# Patient Record
Sex: FEMALE | Race: WHITE
[De-identification: names, ages, dates, MRNs, and addresses within clinical notes are randomized per-mention and may not be internally consistent; named-entity substitution may affect disease eponyms.]

---

## 2020-07-07 ENCOUNTER — APPOINTMENT (OUTPATIENT)
Dept: RHEUMATOLOGY | Facility: CLINIC | Age: 65
End: 2020-07-07
Payer: COMMERCIAL

## 2020-07-07 VITALS
DIASTOLIC BLOOD PRESSURE: 80 MMHG | TEMPERATURE: 98 F | SYSTOLIC BLOOD PRESSURE: 120 MMHG | HEART RATE: 78 BPM | OXYGEN SATURATION: 97 % | BODY MASS INDEX: 28.35 KG/M2 | WEIGHT: 160 LBS | HEIGHT: 63 IN

## 2020-07-07 DIAGNOSIS — Z78.9 OTHER SPECIFIED HEALTH STATUS: ICD-10-CM

## 2020-07-07 DIAGNOSIS — Z82.69 FAMILY HISTORY OF OTHER DISEASES OF THE MUSCULOSKELETAL SYSTEM AND CONNECTIVE TISSUE: ICD-10-CM

## 2020-07-07 DIAGNOSIS — Z82.49 FAMILY HISTORY OF ISCHEMIC HEART DISEASE AND OTHER DISEASES OF THE CIRCULATORY SYSTEM: ICD-10-CM

## 2020-07-07 PROCEDURE — 36415 COLL VENOUS BLD VENIPUNCTURE: CPT

## 2020-07-07 PROCEDURE — 99244 OFF/OP CNSLTJ NEW/EST MOD 40: CPT | Mod: 25

## 2020-07-07 RX ORDER — BIOTIN 10 MG
TABLET ORAL
Refills: 0 | Status: ACTIVE | COMMUNITY

## 2020-07-07 RX ORDER — PROPRANOLOL HYDROCHLORIDE 40 MG/1
40 TABLET ORAL
Refills: 0 | Status: ACTIVE | COMMUNITY

## 2020-07-07 RX ORDER — AMLODIPINE BESYLATE 10 MG/1
10 TABLET ORAL
Refills: 0 | Status: ACTIVE | COMMUNITY

## 2020-07-07 RX ORDER — SIMVASTATIN 40 MG/1
40 TABLET, FILM COATED ORAL
Refills: 0 | Status: ACTIVE | COMMUNITY

## 2020-07-07 NOTE — ASSESSMENT
[FreeTextEntry1] : 65 y/o woman with hx of HTN, HLD, who presents for management of her hx of RA.  I agree with the diagnosis.  \par She is in need of acute as well as maintenance treatment.  \par \par \par Plan:\par -Rx prednisone 20mg daily 4 days, then 15mg daily x 7 days, then 10mg daily x 14 days, then 5mg daily thereafter\par -Check Hep B/C/Quantiferon\par -Plan to start leflunomide 10mg daily.  Reviewed potential side effects of LEF which include but are not limited to elevated liver enzymes with liver damage, cytopenias/decreased blood counts, allergic reaction, abdominal bloating/cramping/diarrhea.\par -Patient is aware to hold LEF if develops infection.  Patient is aware to avoid alcohol while on this medication.  \par \par -f/u 6 weeks, labs at 4 weeks

## 2020-07-09 DIAGNOSIS — Z87.39 PERSONAL HISTORY OF OTHER DISEASES OF THE MUSCULOSKELETAL SYSTEM AND CONNECTIVE TISSUE: ICD-10-CM

## 2020-07-09 LAB
ALBUMIN SERPL ELPH-MCNC: 4.5 G/DL
ALP BLD-CCNC: 61 U/L
ALT SERPL-CCNC: 26 U/L
ANA PAT FLD IF-IMP: ABNORMAL
ANA SER IF-ACNC: ABNORMAL
ANION GAP SERPL CALC-SCNC: 12 MMOL/L
APPEARANCE: CLEAR
AST SERPL-CCNC: 25 U/L
BASOPHILS # BLD AUTO: 0.03 K/UL
BASOPHILS NFR BLD AUTO: 0.5 %
BILIRUB SERPL-MCNC: 0.5 MG/DL
BILIRUBIN URINE: NEGATIVE
BLOOD URINE: NEGATIVE
BUN SERPL-MCNC: 10 MG/DL
C3 SERPL-MCNC: 136 MG/DL
C4 SERPL-MCNC: 21 MG/DL
CALCIUM SERPL-MCNC: 9.8 MG/DL
CCP AB SER IA-ACNC: >250 UNITS
CENTROMERE IGG SER-ACNC: <0.2 CD:130001892
CHLORIDE SERPL-SCNC: 100 MMOL/L
CO2 SERPL-SCNC: 29 MMOL/L
COLOR: NORMAL
CREAT SERPL-MCNC: 0.65 MG/DL
CRP SERPL-MCNC: <0.1 MG/DL
DSDNA AB SER-ACNC: 12 IU/ML
ENA RNP AB SER IA-ACNC: 0.6 AL
ENA SCL70 IGG SER IA-ACNC: <0.2 AL
ENA SM AB SER IA-ACNC: <0.2 AL
ENA SS-A AB SER IA-ACNC: <0.2 AL
ENA SS-B AB SER IA-ACNC: <0.2 AL
EOSINOPHIL # BLD AUTO: 0.09 K/UL
EOSINOPHIL NFR BLD AUTO: 1.5 %
ERYTHROCYTE [SEDIMENTATION RATE] IN BLOOD BY WESTERGREN METHOD: 46 MM/HR
GLUCOSE QUALITATIVE U: NEGATIVE
GLUCOSE SERPL-MCNC: 88 MG/DL
HBV CORE IGG+IGM SER QL: NONREACTIVE
HBV SURFACE AB SERPL IA-ACNC: <3 MIU/ML
HBV SURFACE AG SER QL: NONREACTIVE
HCT VFR BLD CALC: 42.2 %
HCV AB SER QL: NONREACTIVE
HCV S/CO RATIO: 0.16 S/CO
HGB BLD-MCNC: 13.4 G/DL
IMM GRANULOCYTES NFR BLD AUTO: 0.2 %
KETONES URINE: NEGATIVE
LEUKOCYTE ESTERASE URINE: NEGATIVE
LYMPHOCYTES # BLD AUTO: 1.95 K/UL
LYMPHOCYTES NFR BLD AUTO: 33.3 %
M TB IFN-G BLD-IMP: NEGATIVE
MAN DIFF?: NORMAL
MCHC RBC-ENTMCNC: 29.6 PG
MCHC RBC-ENTMCNC: 31.8 GM/DL
MCV RBC AUTO: 93.4 FL
MONOCYTES # BLD AUTO: 0.39 K/UL
MONOCYTES NFR BLD AUTO: 6.7 %
NEUTROPHILS # BLD AUTO: 3.38 K/UL
NEUTROPHILS NFR BLD AUTO: 57.8 %
NITRITE URINE: NEGATIVE
PH URINE: 7
PLATELET # BLD AUTO: 208 K/UL
POTASSIUM SERPL-SCNC: 4.2 MMOL/L
PROT SERPL-MCNC: 7.7 G/DL
PROTEIN URINE: NEGATIVE
QUANTIFERON TB PLUS MITOGEN MINUS NIL: 6.11 IU/ML
QUANTIFERON TB PLUS NIL: 0.02 IU/ML
QUANTIFERON TB PLUS TB1 MINUS NIL: 0 IU/ML
QUANTIFERON TB PLUS TB2 MINUS NIL: 0 IU/ML
RBC # BLD: 4.52 M/UL
RBC # FLD: 13.1 %
RF+CCP IGG SER-IMP: ABNORMAL
RHEUMATOID FACT SER QL: 368 IU/ML
SODIUM SERPL-SCNC: 141 MMOL/L
SPECIFIC GRAVITY URINE: 1.01
THYROGLOB AB SERPL-ACNC: <20 IU/ML
THYROPEROXIDASE AB SERPL IA-ACNC: 17.8 IU/ML
UROBILINOGEN URINE: NORMAL
WBC # FLD AUTO: 5.85 K/UL

## 2020-08-13 ENCOUNTER — RX RENEWAL (OUTPATIENT)
Age: 65
End: 2020-08-13

## 2020-08-14 ENCOUNTER — RX RENEWAL (OUTPATIENT)
Age: 65
End: 2020-08-14

## 2020-08-25 ENCOUNTER — APPOINTMENT (OUTPATIENT)
Dept: RHEUMATOLOGY | Facility: CLINIC | Age: 65
End: 2020-08-25

## 2020-09-22 ENCOUNTER — APPOINTMENT (OUTPATIENT)
Dept: RHEUMATOLOGY | Facility: CLINIC | Age: 65
End: 2020-09-22
Payer: COMMERCIAL

## 2020-09-22 VITALS
SYSTOLIC BLOOD PRESSURE: 130 MMHG | WEIGHT: 172 LBS | TEMPERATURE: 97.7 F | HEIGHT: 63 IN | BODY MASS INDEX: 30.48 KG/M2 | HEART RATE: 72 BPM | OXYGEN SATURATION: 97 % | DIASTOLIC BLOOD PRESSURE: 80 MMHG

## 2020-09-22 PROCEDURE — 99215 OFFICE O/P EST HI 40 MIN: CPT | Mod: 25

## 2020-09-22 PROCEDURE — 90686 IIV4 VACC NO PRSV 0.5 ML IM: CPT

## 2020-09-22 PROCEDURE — 36415 COLL VENOUS BLD VENIPUNCTURE: CPT

## 2020-09-22 PROCEDURE — G0008: CPT

## 2020-09-22 NOTE — REVIEW OF SYSTEMS
[Negative] : Heme/Lymph [Lower Ext Edema] : lower extremity edema [Anxiety] : anxiety [As Noted in HPI] : as noted in HPI [Arthralgias] : arthralgias [Joint Pain] : joint pain [Joint Swelling] : joint swelling [Joint Stiffness] : joint stiffness [Depression] : depression [FreeTextEntry2] : night sweats finally resolved last yr.. otherwise ok  [FreeTextEntry3] : R eye lazy but no visual disturbance and early cataract L, nothing elswe  [FreeTextEntry4] : long standing dental issues no significant sicca  [FreeTextEntry5] : denies hx DVT, PE, MI/ CVA -occas ... SVT on propranol..  [de-identified] : mild intermittent tingling in fingers/ toes -mild intermittent  [de-identified] : always fatigued, but can't sit still.. anxiety and depression moderate-severe today

## 2020-09-22 NOTE — ASSESSMENT
[FreeTextEntry1] : 64 y/o woman with hx of HTN, HLD, NERISSA/ MDD w/ chronic Rx pain medications, presents for management of her hx of seropositive RA.    \par \par \par 1) RA:  seropositive , CCP > 250 w/ ESR 47 upon initial eval.. off all tx for several ys between rheumatologists.  \par Now on leflunomide 10 mg w/ incomplete response (well tolerated), pain/ mild synovitis persists despite also taking 10 mg pred (some definite increase in anxiety noted).. \par Needs updated xrays of hands/ cspine to r/o erosive change, few physical deformities noted on exam\par Denies extraarticular disease \par Lengthy discussion regarding tx options, though we could increase leflunomide to 20 mg would rather like to consider add on tx.. \par Will try Xeljanx now.. if incomplete response can increase leflunomide.. given seropositivity.. and hx of severe pain resulting in need for chronic opiates when RA not well controlled.  \par NOTE: \par Prednisone\par Humira x 1 y then loss efficacy\par MTX ?? not effective or tolerated?\par Orencia x 1 yr then loss efficacy \par \par 2) Mood disorder:  ys of NERISSA/ MDD intermittent tx in past, on nothing now and admits to increase in anxiety.. \par Would like something to help.. \par Sleep ok:  though NRS.. exhausted and falls right to sleep, sleeps through but NRS.. \par Is willing and eager to start something to help with mood. For immediate relief given hydroxyzine 25 mg ok to use 1-2 tabs up to q 4 hs if necessary, caution driving 2/2 sedation\par NOTE :\par sertraline not effective \par Lexapro not effectve \par \par 3) Chronic pain syndrome:  likely 2/2 fair/ poorly controlled RA, little on exam to suggest advanced DJD.. \par Longstanding use Suboxone working w/ Dr. snow.. \par \par \par Plan:\par - should get updated hands xrays and c spine\par  \par - For now continue prednisone at 5 mg twice daily.. till next visit \par \par - for now continue leflunomide at 10 mg daily.. may in future increase this.. \par \par - start citalopram 10 mg daily x 2 wks then start...\par \par - Xeljanz.. after 1 wk if ok and \par \par - if depression and anxiety still considerable increase to 20 mg citalopram.. \par \par - 1 wk before next visit, lower prednisone 5 mg once daily... \par \par - talk to your pain management dr about lowering suboxone.. with goal of stopping completely in future.. \par \par - for immediate anxiety use Hydroxyzine as needed up to every 4-6 hs if not too sleepy...   \par \par -f/u 4  weeks

## 2020-09-22 NOTE — HISTORY OF PRESENT ILLNESS
[FreeTextEntry1] : 9/22/20\par - symmetrical joint pain most severe hands b/l at [facility]/ wrists and shoulders/ neck.. up to 2 hs am stiffness.. despite tx \par - taking 10 mg pred and started leflunomide 10 mg, both well tolerated though admits increased anxiety over past several weeks.. though also admits to tremendous stress at home\par - see calc:  Anxiety and depression + Has been on sertrline, lexapro in past w/ limited benefit.  Requesting something for anxiety.. but on suboxone long term, can't consider benzo.. \par - Humira worked for nearly 1 yr then stopped .. and Orencia x 1 yr lost efficacy \par - on suboxone:  per Dr Jara ( chronic pain medication.. \par \par 1) SEropositive RA:  high titer RF/ CCP\par \par This is a 65 y/o woman with hx of HTN, HLD, RA diagnosed many years ago.  \par \par She initially saw Dr. Nino, who told her it could be RA or could be lupus, based on some serologies.  He stopped taking her insurance, so she followed with another Rheumatologist, Dr. Perri Lara.  Last visit was several years ago.  She stopped taking the RA meds because she felt it wasn't helping.\par \par She was previously on \par Prednisone\par Humira\par MTX\par Orencia.\par \par No adverse reactions that she recalls.  \par \par Currently she has pain in hands/wrists/knees/ankles/feet.\par She has AM stiffness of about 2 hours.  \par

## 2020-09-22 NOTE — PHYSICAL EXAM
[General Appearance - Alert] : alert [General Appearance - In No Acute Distress] : in no acute distress [General Appearance - Well Nourished] : well nourished [General Appearance - Well Developed] : well developed [General Appearance - Well-Appearing] : healthy appearing [Sclera] : the sclera and conjunctiva were normal [PERRL With Normal Accommodation] : pupils were equal in size, round, and reactive to light [Extraocular Movements] : extraocular movements were intact [Outer Ear] : the ears and nose were normal in appearance [Hearing Threshold Finger Rub Not [place]] : hearing was normal [Examination Of The Oral Cavity] : the lips and gums were normal [Nasal Cavity] : the nasal mucosa and septum were normal [Oropharynx] : the oropharynx was normal [Neck Appearance] : the appearance of the neck was normal [Neck Cervical Mass (___cm)] : no neck mass was observed [Jugular Venous Distention Increased] : there was no jugular-venous distention [Thyroid Diffuse Enlargement] : the thyroid was not enlarged [Thyroid Nodule] : there were no palpable thyroid nodules [Auscultation Breath Sounds / Voice Sounds] : lungs were clear to auscultation bilaterally [Heart Rate And Rhythm] : heart rate was normal and rhythm regular [Heart Sounds] : normal S1 and S2 [Heart Sounds Gallop] : no gallops [Murmurs] : no murmurs [Heart Sounds Pericardial Friction Rub] : no pericardial rub [Edema] : there was no peripheral edema [Bowel Sounds] : normal bowel sounds [Abdomen Soft] : soft [Abdomen Tenderness] : non-tender [Abdomen Mass (___ Cm)] : no abdominal mass palpated [Cervical Lymph Nodes Enlarged Posterior Bilaterally] : posterior cervical [Cervical Lymph Nodes Enlarged Anterior Bilaterally] : anterior cervical [Supraclavicular Lymph Nodes Enlarged Bilaterally] : supraclavicular [No CVA Tenderness] : no ~M costovertebral angle tenderness [No Spinal Tenderness] : no spinal tenderness [Abnormal Walk] : normal gait [Motor Tone] : muscle strength and tone were normal [Skin Color & Pigmentation] : normal skin color and pigmentation [Skin Turgor] : normal skin turgor [] : no rash [Deep Tendon Reflexes (DTR)] : deep tendon reflexes were 2+ and symmetric [Sensation] : the sensory exam was normal to light touch and pinprick [No Focal Deficits] : no focal deficits [FreeTextEntry1] : 9/22/20 PHQ 12 moderate/ NERISSA 19 severe anxiety, diffucult function..

## 2020-09-22 NOTE — DATA REVIEWED
[FreeTextEntry1] : Labs: \par 7/20 , CCP > 250 w/ ESR 46, EMANI 1:320H,  nl CBC, CMP,  CRP, C3/4, TSH, PTH, CK, SPEP, PTT w/ neg, EMANI and subserologies to include APS/ LAC, SCL70, MARKIE, ACE, TPO/ TG, tTG, Hep B/ C and neg HsAb, quantiferon gold,  UA\par \par \par

## 2020-09-22 NOTE — PROCEDURE
[Today's Date:] : Date: [unfilled] [Risks] : risks [Benefits] : benefits [Alternatives] : alternatives [Consent Obtained] : written consent was obtained prior to the procedure and is detailed in the patient's record [Patient] : Prior to the start of the procedure a time out was taken and the identity of the patient was confirmed via name and date of birth with the patient. The correct site and the procedure to be performed were confirmed. The correct side was confirmed if applicable. The availability of the correct equipment was verified [Therapeutic] : therapeutic [#1 Site: ______] : #1 site identified in the [unfilled] [Alcohol] : alcohol [25 gauge 5/8  inch] : A 25 gauge 5/8 inch needle was used [No Complications] : there were no complications [Instructions Given] : handouts/patient instructions were given to patient [Patient Instructed to Call] : patient was instructed to call if redness at site, a decrease in range of motion or an increase in pain is noted after procedure. [de-identified] : Fluzone [FreeTextEntry1] : monitor for flulike sx or allergic rxn, take APAP as needed for first 3 days, if flulike sx persist call office\par \par \par \par

## 2020-09-24 LAB
ALBUMIN SERPL ELPH-MCNC: 4.4 G/DL
ALP BLD-CCNC: 64 U/L
ALT SERPL-CCNC: 18 U/L
ANION GAP SERPL CALC-SCNC: 12 MMOL/L
AST SERPL-CCNC: 17 U/L
BASOPHILS # BLD AUTO: 0.02 K/UL
BASOPHILS NFR BLD AUTO: 0.4 %
BILIRUB SERPL-MCNC: 0.5 MG/DL
BUN SERPL-MCNC: 9 MG/DL
CALCIUM SERPL-MCNC: 9.5 MG/DL
CHLORIDE SERPL-SCNC: 101 MMOL/L
CO2 SERPL-SCNC: 27 MMOL/L
CREAT SERPL-MCNC: 0.63 MG/DL
CRP SERPL-MCNC: <0.1 MG/DL
EOSINOPHIL # BLD AUTO: 0.06 K/UL
EOSINOPHIL NFR BLD AUTO: 1.3 %
ERYTHROCYTE [SEDIMENTATION RATE] IN BLOOD BY WESTERGREN METHOD: 28 MM/HR
GLUCOSE SERPL-MCNC: 106 MG/DL
HCT VFR BLD CALC: 43.4 %
HGB BLD-MCNC: 13.6 G/DL
IMM GRANULOCYTES NFR BLD AUTO: 0 %
LYMPHOCYTES # BLD AUTO: 1.59 K/UL
LYMPHOCYTES NFR BLD AUTO: 35 %
MAN DIFF?: NORMAL
MCHC RBC-ENTMCNC: 29.4 PG
MCHC RBC-ENTMCNC: 31.3 GM/DL
MCV RBC AUTO: 93.7 FL
MONOCYTES # BLD AUTO: 0.4 K/UL
MONOCYTES NFR BLD AUTO: 8.8 %
NEUTROPHILS # BLD AUTO: 2.47 K/UL
NEUTROPHILS NFR BLD AUTO: 54.5 %
PLATELET # BLD AUTO: 178 K/UL
POTASSIUM SERPL-SCNC: 4.2 MMOL/L
PROT SERPL-MCNC: 7.6 G/DL
RBC # BLD: 4.63 M/UL
RBC # FLD: 12.5 %
SODIUM SERPL-SCNC: 140 MMOL/L
WBC # FLD AUTO: 4.54 K/UL

## 2020-10-01 ENCOUNTER — RX RENEWAL (OUTPATIENT)
Age: 65
End: 2020-10-01

## 2020-10-04 ENCOUNTER — RX RENEWAL (OUTPATIENT)
Age: 65
End: 2020-10-04

## 2020-10-21 NOTE — HISTORY OF PRESENT ILLNESS
[FreeTextEntry1] : This is a 63 y/o woman with hx of HTN, HLD, RA diagnosed many years ago.  \par \par She initially saw Dr. Nino, who told her it could be RA or could be lupus, based on some serologies.  He stopped taking her insurance, so she followed with another Rheumatologist, Dr. Perri Lara.  Last visit was several years ago.  She stopped taking the RA meds because she felt it wasn't helping.\par \par She was previously on \par Prednisone\par Humira\par MTX\par Orencia.\par \par No adverse reactions that she recalls.  \par \par Currently she has pain in hands/wrists/knees/ankles/feet.\par She has AM stiffness of about 2 hours.  \par 
done

## 2020-11-18 ENCOUNTER — APPOINTMENT (OUTPATIENT)
Dept: RHEUMATOLOGY | Facility: CLINIC | Age: 65
End: 2020-11-18
Payer: COMMERCIAL

## 2020-11-18 VITALS
DIASTOLIC BLOOD PRESSURE: 80 MMHG | BODY MASS INDEX: 28.7 KG/M2 | TEMPERATURE: 97.5 F | HEIGHT: 63 IN | SYSTOLIC BLOOD PRESSURE: 130 MMHG | OXYGEN SATURATION: 97 % | HEART RATE: 82 BPM | WEIGHT: 162 LBS

## 2020-11-18 DIAGNOSIS — Z00.00 ENCOUNTER FOR GENERAL ADULT MEDICAL EXAMINATION W/OUT ABNORMAL FINDINGS: ICD-10-CM

## 2020-11-18 PROCEDURE — 99214 OFFICE O/P EST MOD 30 MIN: CPT | Mod: 25

## 2020-11-18 PROCEDURE — 36415 COLL VENOUS BLD VENIPUNCTURE: CPT

## 2020-11-19 NOTE — PHYSICAL EXAM
[General Appearance - Alert] : alert [General Appearance - In No Acute Distress] : in no acute distress [General Appearance - Well Nourished] : well nourished [General Appearance - Well Developed] : well developed [General Appearance - Well-Appearing] : healthy appearing [Sclera] : the sclera and conjunctiva were normal [PERRL With Normal Accommodation] : pupils were equal in size, round, and reactive to light [Extraocular Movements] : extraocular movements were intact [Outer Ear] : the ears and nose were normal in appearance [Hearing Threshold Finger Rub Not [place]] : hearing was normal [Examination Of The Oral Cavity] : the lips and gums were normal [Nasal Cavity] : the nasal mucosa and septum were normal [Oropharynx] : the oropharynx was normal [Neck Appearance] : the appearance of the neck was normal [Neck Cervical Mass (___cm)] : no neck mass was observed [Jugular Venous Distention Increased] : there was no jugular-venous distention [Thyroid Diffuse Enlargement] : the thyroid was not enlarged [Thyroid Nodule] : there were no palpable thyroid nodules [Auscultation Breath Sounds / Voice Sounds] : lungs were clear to auscultation bilaterally [Heart Rate And Rhythm] : heart rate was normal and rhythm regular [Heart Sounds] : normal S1 and S2 [Heart Sounds Gallop] : no gallops [Murmurs] : no murmurs [Heart Sounds Pericardial Friction Rub] : no pericardial rub [Edema] : there was no peripheral edema [Bowel Sounds] : normal bowel sounds [Abdomen Soft] : soft [Abdomen Tenderness] : non-tender [Abdomen Mass (___ Cm)] : no abdominal mass palpated [Cervical Lymph Nodes Enlarged Posterior Bilaterally] : posterior cervical [Cervical Lymph Nodes Enlarged Anterior Bilaterally] : anterior cervical [Supraclavicular Lymph Nodes Enlarged Bilaterally] : supraclavicular [No CVA Tenderness] : no ~M costovertebral angle tenderness [No Spinal Tenderness] : no spinal tenderness [Abnormal Walk] : normal gait [Motor Tone] : muscle strength and tone were normal [Skin Color & Pigmentation] : normal skin color and pigmentation [Skin Turgor] : normal skin turgor [] : no rash [Deep Tendon Reflexes (DTR)] : deep tendon reflexes were 2+ and symmetric [Sensation] : the sensory exam was normal to light touch and pinprick [No Focal Deficits] : no focal deficits [FreeTextEntry1] : 9/22/20 PHQ 12 moderate/ NERISSA 19 severe anxiety, diffucult function..

## 2020-11-19 NOTE — HISTORY OF PRESENT ILLNESS
[FreeTextEntry1] : 11/18/20\par - under tremendous stress,  sudden illness.. severe sepsis- septic shock.. w/ MSOF w/ endocarditis...  now at home, primary caregiver.. very stressed...\par - citalopram started since last visit.. absolutely feels this has been helpful.... would like to increase dose\par - started Xeljanz well tolerated.. and decreased pred to 5 mg and feeling well despite stessors as noted\par - hydroxyzine + response for anxiety taking 1-2/day\par - on suboxone:  per Dr Jara ( chronic pain medication.. \par \par 1) SEropositive RA:  high titer RF/ [facility]CP\par \par This is a 65 y/o woman with hx of HTN, HLD, RA diagnosed many years ago.  \par \par She initially saw Dr. Nino, who told her it could be RA or could be lupus, based on some serologies.  He stopped taking her insurance, so she followed with another Rheumatologist, Dr. Perri Lara.  Last visit was several years ago.  She stopped taking the RA meds because she felt it wasn't helping.\par \par She was previously on \par Prednisone\par Humira\par MTX\par Orencia.\par \par No adverse reactions that she recalls.  \par \par Currently she has pain in hands/wrists/knees/ankles/feet.\par She has AM stiffness of about 2 hours.  \par

## 2020-11-19 NOTE — REVIEW OF SYSTEMS
[Lower Ext Edema] : lower extremity edema [As Noted in HPI] : as noted in HPI [Arthralgias] : arthralgias [Joint Pain] : joint pain [Joint Swelling] : joint swelling [Joint Stiffness] : joint stiffness [Anxiety] : anxiety [Depression] : depression [Negative] : Heme/Lymph [FreeTextEntry2] : night sweats finally resolved last yr.. otherwise ok  [FreeTextEntry3] : R eye lazy but no visual disturbance and early cataract L, nothing elswe  [FreeTextEntry4] : long standing dental issues no significant sicca  [FreeTextEntry5] : denies hx DVT, PE, MI/ CVA -occas ... SVT on propranol..  [de-identified] : mild intermittent tingling in fingers/ toes -mild intermittent  [de-identified] : always fatigued, but can't sit still.. anxiety and depression moderate-severe today

## 2020-11-19 NOTE — ASSESSMENT
[FreeTextEntry1] : 64 y/o woman with hx of HTN, HLD, NERISSA/ MDD w/ chronic Rx pain medications, presents for management of her hx of seropositive RA.    \par \par \par 1) RA:  seropositive , CCP > 250 w/ ESR 47 upon initial eval.. off all tx for several ys between rheumatologists.  \par Now on leflunomide 10 mg w/ incomplete response (well tolerated), pain/ mild synovitis persists despite also taking 10 mg pred (some definite increase in anxiety noted).. \par Needs updated xrays of hands/ cspine to r/o erosive change, few physical deformities noted on exam\par Denies extraarticular disease \par Lengthy discussion regarding tx options, though we could increase leflunomide to 20 mg would rather like to consider add on tx.. \par Restart Xeljanx now..better overall.. and able to lower pred to 5 mg..\par NOTE: \par Prednisone\par Humira x 1 y then loss efficacy\par MTX ?? not effective or tolerated?\par Orencia x 1 yr then loss efficacy \par \par 2) Mood disorder:  ys of NERISSA/ MDD intermittent tx in past, on nothing now and admits to increase in anxiety.. \par Would like something to help.. \par Sleep ok:  though NRS.. exhausted and falls right to sleep, sleeps through but NRS.. \par Is willing and eager to start something to help with mood. For immediate relief given hydroxyzine 25 mg ok to use 1-2 tabs up to q 4 hs if necessary, caution driving 2/2 sedation\par NOTE :\par sertraline not effective \par Lexapro not effectve \par \par 3) Chronic pain syndrome:  likely 2/2 fair/ poorly controlled RA, little on exam to suggest advanced DJD.. \par Longstanding use Suboxone working w/ Dr. snow.. \par \par \par Plan:\par - should get updated hands xrays and c spine: go to [facility] radiology.. when things settle down \par  \par - For now continue prednisone at 5 mg once  daily.. till Jan then decrease to 4 mg x 2 wks, 3 mg x 2 wks, then 2 mg x 2 wk, call at any time if your joints feel worse \par \par - for now continue leflunomide at 10 mg daily.. may in future increase this.. \par \par - increase citalopram 20 mg daily.\par \par - Xeljanz.. restart now once daily don't run out.. \par \par - 6-8 wk follow up, call if need anything before.  .. \par \par - for immediate anxiety use Hydroxyzine as needed up to every 4-6 hs if not too sleepy...   and at bedtime might helpif you can't relax \par

## 2020-11-22 ENCOUNTER — RX RENEWAL (OUTPATIENT)
Age: 65
End: 2020-11-22

## 2020-11-23 LAB
ALBUMIN SERPL ELPH-MCNC: 4.2 G/DL
ALP BLD-CCNC: 57 U/L
ALT SERPL-CCNC: 21 U/L
ANION GAP SERPL CALC-SCNC: 8 MMOL/L
AST SERPL-CCNC: 25 U/L
BASOPHILS # BLD AUTO: 0.02 K/UL
BASOPHILS NFR BLD AUTO: 0.6 %
BILIRUB SERPL-MCNC: 0.6 MG/DL
BUN SERPL-MCNC: 6 MG/DL
CALCIUM SERPL-MCNC: 9.7 MG/DL
CHLORIDE SERPL-SCNC: 105 MMOL/L
CHOLEST SERPL-MCNC: 187 MG/DL
CO2 SERPL-SCNC: 27 MMOL/L
CREAT SERPL-MCNC: 0.46 MG/DL
CRP SERPL-MCNC: 0.18 MG/DL
EOSINOPHIL # BLD AUTO: 0.05 K/UL
EOSINOPHIL NFR BLD AUTO: 1.4 %
ERYTHROCYTE [SEDIMENTATION RATE] IN BLOOD BY WESTERGREN METHOD: 42 MM/HR
GLUCOSE SERPL-MCNC: 119 MG/DL
HCT VFR BLD CALC: 39.8 %
HDLC SERPL-MCNC: 73 MG/DL
HGB BLD-MCNC: 12.7 G/DL
IMM GRANULOCYTES NFR BLD AUTO: 0 %
LDLC SERPL CALC-MCNC: 99 MG/DL
LYMPHOCYTES # BLD AUTO: 1.16 K/UL
LYMPHOCYTES NFR BLD AUTO: 33.4 %
MAN DIFF?: NORMAL
MCHC RBC-ENTMCNC: 28.2 PG
MCHC RBC-ENTMCNC: 31.9 GM/DL
MCV RBC AUTO: 88.2 FL
MONOCYTES # BLD AUTO: 0.4 K/UL
MONOCYTES NFR BLD AUTO: 11.5 %
NEUTROPHILS # BLD AUTO: 1.84 K/UL
NEUTROPHILS NFR BLD AUTO: 53.1 %
NONHDLC SERPL-MCNC: 114 MG/DL
PLATELET # BLD AUTO: 174 K/UL
POTASSIUM SERPL-SCNC: 4.3 MMOL/L
PROT SERPL-MCNC: 7.2 G/DL
RBC # BLD: 4.51 M/UL
RBC # FLD: 12.7 %
SODIUM SERPL-SCNC: 140 MMOL/L
TRIGL SERPL-MCNC: 75 MG/DL
WBC # FLD AUTO: 3.47 K/UL

## 2021-03-27 ENCOUNTER — RX RENEWAL (OUTPATIENT)
Age: 66
End: 2021-03-27

## 2021-04-27 ENCOUNTER — LABORATORY RESULT (OUTPATIENT)
Age: 66
End: 2021-04-27

## 2021-04-27 ENCOUNTER — NON-APPOINTMENT (OUTPATIENT)
Age: 66
End: 2021-04-27

## 2021-04-27 ENCOUNTER — APPOINTMENT (OUTPATIENT)
Dept: RHEUMATOLOGY | Facility: CLINIC | Age: 66
End: 2021-04-27

## 2021-04-27 ENCOUNTER — APPOINTMENT (OUTPATIENT)
Dept: RHEUMATOLOGY | Facility: CLINIC | Age: 66
End: 2021-04-27
Payer: COMMERCIAL

## 2021-04-27 PROCEDURE — 99443: CPT

## 2021-05-04 ENCOUNTER — APPOINTMENT (OUTPATIENT)
Dept: RHEUMATOLOGY | Facility: CLINIC | Age: 66
End: 2021-05-04
Payer: COMMERCIAL

## 2021-05-04 ENCOUNTER — NON-APPOINTMENT (OUTPATIENT)
Age: 66
End: 2021-05-04

## 2021-05-04 VITALS
HEART RATE: 68 BPM | BODY MASS INDEX: 27.46 KG/M2 | TEMPERATURE: 97.9 F | SYSTOLIC BLOOD PRESSURE: 120 MMHG | OXYGEN SATURATION: 98 % | WEIGHT: 155 LBS | DIASTOLIC BLOOD PRESSURE: 68 MMHG

## 2021-05-04 PROCEDURE — 99072 ADDL SUPL MATRL&STAF TM PHE: CPT

## 2021-05-04 PROCEDURE — 20610 DRAIN/INJ JOINT/BURSA W/O US: CPT | Mod: LT

## 2021-05-04 PROCEDURE — 99214 OFFICE O/P EST MOD 30 MIN: CPT | Mod: 25

## 2021-05-04 RX ORDER — METHYLPRED ACET/NACL,ISO-OS/PF 80 MG/ML
80 VIAL (ML) INJECTION
Qty: 1 | Refills: 0 | Status: COMPLETED | OUTPATIENT
Start: 2021-05-04

## 2021-05-04 RX ADMIN — METHYLPREDNISOLONE ACETATE 0 MG/ML: 80 INJECTION, SUSPENSION INTRA-ARTICULAR; INTRALESIONAL; INTRAMUSCULAR; SOFT TISSUE at 00:00

## 2021-05-09 NOTE — HISTORY OF PRESENT ILLNESS
[FreeTextEntry1] : 5/4/21\par - back on prednisone 3 mg daily and resumed LEF/ Xeljanz.. some improvement but incomplete still L shoulder pain/ limited ROM, b/l shoulders and wrists L knee all pain/ swelling.. \par - Still under tremendous stress with husbands illness (sepsis/ complications.. ).  Continues to work.. FT\par -labs 5/3/21 stable CBC, CMP, ESR 23, CRP nl \par - hydrozyzine too drowsy.. though can be effective, isn't using routinely and anxiety considerable given multiple stressors.  Also not c/w citalopram, c/o this too is too sedating.. better when taken at HS. Is requesting something for anxiety..but concerned about use of benzo w/ chronic opiates- trial buspar.. ? response \par - on suboxone:  per Dr Jara (chronic pain medication.. but has missed doses.. 2/2 stress and not getting to office (? reportedly- will do istop)\par \par 1) SEropositive RA:  high titer RF/ CCP\par 2) REactive depression/ anxiety, r/t caregiver stress\par __________________________________________________________________________________\par \par This is a 65 y/o woman with hx of HTN, HLD, RA diagnosed many years ago.  \par \par She initially saw Dr. Nino, who told her it could be RA or could be lupus, based on some serologies.  He stopped taking her insurance, so she followed with another Rheumatologist, Dr. Perri Lara.  Last visit was several years ago.  She stopped taking the RA meds because she felt it wasn't helping.\par \par She was previously on \par Prednisone\par Humira\par MTX\par Orencia.\par \par No adverse reactions that she recalls.  \par \par Currently she has pain in hands/wrists/knees/ankles/feet.\par She has AM stiffness of about 2 hours.  \par

## 2021-05-09 NOTE — ASSESSMENT
[FreeTextEntry1] : 66 y/o woman with hx of HTN, HLD, NERISSA/ MDD w/ chronic Rx pain medications, presents for management of her hx of seropositive RA.    \par \par \par 1) RA:  seropositive , CCP > 250 w/ ESR 47 upon initial eval.. off all tx for several ys till 7/20... started LEF 10 mg and added Xeljanz with some + response when taken consistently rarely needs steroids- then off LEF recently - ran out.. had to restart  3 mg pred again and resumed low dose LEF at 10 mg daily this week and still feeling poorly, given L SAB depo 80 mg today..   Life continues to be very stressful ( septic shock, multiple complications, still requires care) and working FT. \par Has not had updated labs, or imaging.. \par Clarification today needs to be on both.. updated labs / imaging needed.  Ok to use low dose pred as needed, monitor precise dose\par Needs updated xrays and labs as repeatedly discussed.. r/o erosive arthropathy, few physical deformities noted on exam\par Denies extraarticular disease .\par NOTE: \par Prednisone\par Humira x 1 y then loss efficacy\par MTX ?? not effective or tolerated?\par Orencia x 1 yr then loss efficacy \par \par 2) Mood disorder:  ys of NERISSA/ MDD intermittent tx in past, on nothing now and admits to increase in anxiety.. 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  \par Would like something to help.. Was started on citalopram but not taking as directed.. was using PRN.. advised nightly dose (some fatigue experienced) .... since last visit more consistent and is slightly bettter.. \par Hydroxyzine too sedating for daytime use.. change to Buspar (previous low dose not effective, advised to increase as needed to 10 mg BID)\par Sleep ok:  though NRS.. exhausted and falls right to sleep, sleeps through but NRS.. \par NOTE :\par sertraline not effective \par Lexapro not effective \par \par 3) Chronic pain syndrome:  likely 2/2 fair/ poorly controlled RA, little on exam to suggest advanced DJD.. \par Longstanding use Suboxone working w/ Dr. Jara.. \par \par 4) Overweight:  BMI 27 too many other issues today to address.. \par \par \par Plan:\par - should get updated hands xrays and c spine: go to [facility] radiology.. when things settle down \par \par - L SAB injection depo 80 mg for immediate relief.. and to improve function\par  \par - For now continue prednisone at 2-3 mg as needed PRN... but try to taper off over next few months decreasing by 1 mg every 2 wks till off.  Call if trouble weaning off.. will increase LEF\par \par - continue leflunomide (restart) at 10 mg daily.. may in future increase this.. \par \par - increase citalopram 20 mg daily.\par \par - continue Xeljanz.. restart now once daily don't run out.. \par \par - needs physical exam and updated Labs.. .. \par \par - add buspar 5 mg BID and increase to 10 mg BID if needed.. \par \par - rto 2 m\par \par \par

## 2021-05-09 NOTE — PHYSICAL EXAM
[Sensation] : the sensory exam was normal to light touch and pinprick [No Focal Deficits] : no focal deficits [Neck Appearance] : the appearance of the neck was normal [Neck Cervical Mass (___cm)] : no neck mass was observed [Jugular Venous Distention Increased] : there was no jugular-venous distention [Thyroid Diffuse Enlargement] : the thyroid was not enlarged [Thyroid Nodule] : there were no palpable thyroid nodules [] : no respiratory distress [Auscultation Breath Sounds / Voice Sounds] : lungs were clear to auscultation bilaterally [Heart Rate And Rhythm] : heart rate was normal and rhythm regular [Heart Sounds] : normal S1 and S2 [Heart Sounds Gallop] : no gallops [Murmurs] : no murmurs [Heart Sounds Pericardial Friction Rub] : no pericardial rub [Edema] : there was no peripheral edema [Cervical Lymph Nodes Enlarged Posterior Bilaterally] : posterior cervical [Cervical Lymph Nodes Enlarged Anterior Bilaterally] : anterior cervical [Supraclavicular Lymph Nodes Enlarged Bilaterally] : supraclavicular [No CVA Tenderness] : no ~M costovertebral angle tenderness [No Spinal Tenderness] : no spinal tenderness [FreeTextEntry1] : 9/22/20 PHQ 12 moderate/ NERISSA 19 severe anxiety, diffucult function..

## 2021-05-09 NOTE — REVIEW OF SYSTEMS
[Lower Ext Edema] : lower extremity edema [As Noted in HPI] : as noted in HPI [Arthralgias] : arthralgias [Joint Pain] : joint pain [Joint Swelling] : joint swelling [Joint Stiffness] : joint stiffness [Anxiety] : anxiety [Depression] : depression [Negative] : Heme/Lymph [FreeTextEntry2] : hot flashes resolved 2 ys ago yr.. otherwise ok  [FreeTextEntry3] : R eye lazy but no visual disturbance and early cataract L, nothing else [FreeTextEntry4] : long standing dental issues no significant sicca  [FreeTextEntry5] : denies hx DVT, PE, MI/ CVA -occas ... SVT on propranol..  [FreeTextEntry9] : 1 m ago- better since resuming this and adding pred - but poorly controlled past few mnths with inconsistent tx and stress [de-identified] : mild intermittent tingling in fingers/ toes -mild intermittent  [de-identified] : always fatigued, but can't sit still.. anxiety and depression moderate-severe today

## 2021-05-20 LAB
25(OH)D3 SERPL-MCNC: 16.8 NG/ML
ALBUMIN SERPL ELPH-MCNC: 4.3 G/DL
ALP BLD-CCNC: 54 U/L
ALT SERPL-CCNC: 19 U/L
ANION GAP SERPL CALC-SCNC: 12 MMOL/L
APPEARANCE: CLEAR
AST SERPL-CCNC: 25 U/L
BASOPHILS # BLD AUTO: 0.02 K/UL
BASOPHILS NFR BLD AUTO: 0.5 %
BILIRUB SERPL-MCNC: 0.4 MG/DL
BILIRUBIN URINE: NEGATIVE
BLOOD URINE: ABNORMAL
BUN SERPL-MCNC: 11 MG/DL
CALCIUM SERPL-MCNC: 9.4 MG/DL
CHLORIDE SERPL-SCNC: 104 MMOL/L
CO2 SERPL-SCNC: 25 MMOL/L
COLOR: COLORLESS
CREAT SERPL-MCNC: 0.59 MG/DL
CRP SERPL-MCNC: <3 MG/L
EOSINOPHIL # BLD AUTO: 0.06 K/UL
EOSINOPHIL NFR BLD AUTO: 1.5 %
ERYTHROCYTE [SEDIMENTATION RATE] IN BLOOD BY WESTERGREN METHOD: 23 MM/HR
GLUCOSE QUALITATIVE U: NEGATIVE
GLUCOSE SERPL-MCNC: 93 MG/DL
HBV CORE IGG+IGM SER QL: NONREACTIVE
HBV SURFACE AB SER QL: NONREACTIVE
HBV SURFACE AG SER QL: NONREACTIVE
HCT VFR BLD CALC: 36.6 %
HCV AB SER QL: NONREACTIVE
HCV S/CO RATIO: 0.13 S/CO
HGB BLD-MCNC: 12 G/DL
IMM GRANULOCYTES NFR BLD AUTO: 0.2 %
KETONES URINE: NEGATIVE
LEUKOCYTE ESTERASE URINE: NEGATIVE
LYMPHOCYTES # BLD AUTO: 1.35 K/UL
LYMPHOCYTES NFR BLD AUTO: 33.7 %
M TB IFN-G BLD-IMP: NEGATIVE
MAN DIFF?: NORMAL
MCHC RBC-ENTMCNC: 30.1 PG
MCHC RBC-ENTMCNC: 32.8 GM/DL
MCV RBC AUTO: 91.7 FL
MONOCYTES # BLD AUTO: 0.48 K/UL
MONOCYTES NFR BLD AUTO: 12 %
NEUTROPHILS # BLD AUTO: 2.09 K/UL
NEUTROPHILS NFR BLD AUTO: 52.1 %
NITRITE URINE: NEGATIVE
PH URINE: 8
PLATELET # BLD AUTO: 180 K/UL
POTASSIUM SERPL-SCNC: 4.4 MMOL/L
PROT SERPL-MCNC: 7.1 G/DL
PROTEIN URINE: NEGATIVE
QUANTIFERON TB PLUS MITOGEN MINUS NIL: 7.33 IU/ML
QUANTIFERON TB PLUS NIL: 0.04 IU/ML
QUANTIFERON TB PLUS TB1 MINUS NIL: 0 IU/ML
QUANTIFERON TB PLUS TB2 MINUS NIL: 0 IU/ML
RBC # BLD: 3.99 M/UL
RBC # FLD: 13 %
SODIUM SERPL-SCNC: 140 MMOL/L
SPECIFIC GRAVITY URINE: 1.01
UROBILINOGEN URINE: NORMAL
WBC # FLD AUTO: 4.01 K/UL

## 2021-07-27 ENCOUNTER — APPOINTMENT (OUTPATIENT)
Dept: RHEUMATOLOGY | Facility: CLINIC | Age: 66
End: 2021-07-27

## 2021-07-28 ENCOUNTER — APPOINTMENT (OUTPATIENT)
Dept: RHEUMATOLOGY | Facility: CLINIC | Age: 66
End: 2021-07-28
Payer: COMMERCIAL

## 2021-07-28 DIAGNOSIS — R79.89 OTHER SPECIFIED ABNORMAL FINDINGS OF BLOOD CHEMISTRY: ICD-10-CM

## 2021-07-28 DIAGNOSIS — E66.9 OBESITY, UNSPECIFIED: ICD-10-CM

## 2021-07-28 DIAGNOSIS — Z79.891 LONG TERM (CURRENT) USE OF OPIATE ANALGESIC: ICD-10-CM

## 2021-07-28 PROCEDURE — 99214 OFFICE O/P EST MOD 30 MIN: CPT | Mod: 95

## 2021-07-28 RX ORDER — BUSPIRONE HYDROCHLORIDE 5 MG/1
5 TABLET ORAL
Qty: 120 | Refills: 2 | Status: DISCONTINUED | COMMUNITY
Start: 2021-04-27 | End: 2021-07-28

## 2021-07-28 RX ORDER — HYDROXYZINE HYDROCHLORIDE 25 MG/1
25 TABLET ORAL
Qty: 100 | Refills: 0 | Status: DISCONTINUED | COMMUNITY
Start: 2020-09-22 | End: 2021-07-28

## 2021-07-28 NOTE — PHYSICAL EXAM
[] : no rash [Sensation] : the sensory exam was normal to light touch and pinprick [No Focal Deficits] : no focal deficits [FreeTextEntry1] : 9/22/20 PHQ 12 moderate/ NERISSA 19 severe anxiety, diffucult function..

## 2021-07-28 NOTE — ASSESSMENT
[FreeTextEntry1] : 65 y/o woman with hx of HTN, HLD, NERISSA/ MDD w/ chronic Rx pain medications, presents for management of her hx of seropositive RA.    \par \par \par 1) RA:  seropositive , CCP > 250 w/ ESR 47 upon initial eval.. off all tx for several ys till 7/20... started LEF 10 mg and added Xeljanz with some + response when taken consistently rarely needs steroids- then off LEF recently - ran out.. had to restart  3 mg pred again and resumed low dose LEF at 10 mg daily this week and still feeling poorly, given L SAB depo 80 mg 5/21- with excellent response but not sustained. NOw back to daily prednisone, unable to taper below 5 mg and still active synovitis.  \par -  Life continues to be very stressful ( septic shock, multiple complications, still requires care) and working FT. \par Given difficulty following medical regimen and excellent response to RTX with sister, will schedule RTx infusion as soon as possible. For now increase LEF to 20 mg and mnt 5 mg daily pred... will taper off once RTx started\par Make appt for IACS L knee in next few wks\par Needs updated labs- will set up home draw.  Still need updated xrays and labs as repeatedly discussed.. r/o erosive arthropathy, few physical deformities noted on exam\par Denies extraarticular disease .\par NOTE: \par Prednisone\par Humira x 1 y then loss efficacy\par MTX ?? not effective or tolerated?\par Orencia x 1 yr then loss efficacy \par \par 2) Mood disorder:  ys of NERISSA/ MDD intermittent tx in past.  Trial citalopram 10 mg not effective, increased to 20 mg w/ minimal benefit and too much fatigue.  \par Will try wellbutrin which should help with pain and mood, along w/ attention.  Caution anxiety, call immediately if not tolerated would consider cymbalta in that case \par Trial buspar/ hydrozyine for anxiety NOT tolerated.  \par Sleep ok:  though NRS.. exhausted and falls right to sleep, sleeps through but NRS.. \par NOTE :\par sertraline not effective \par Lexapro and citalopram not effective\par Buspar too sedating  \par \par 3) Chronic pain syndrome:  likely 2/2 fair/ poorly controlled RA, little on exam to suggest advanced DJD.. \par Longstanding use Suboxone working w/ Dr. Jara.. \par \par 4) Overweight:  BMI 27 too many other issues today to address.. \par \par \par Plan:\par - should get updated hands xrays and c spine: go to [facility] radiology.. when things settle down \par \par - lower citalopram to 10 mg, start wellbutrin 75 mg daily - in 2 wks stop citalopram and increase wellbutrin to twice daily.. \par \par - OV Tues 8/17 at noon \par  \par - For now continue prednisone at 5 mg daily, will taper off after start RTX \par \par - Increase LEF to 20 mg now.. \par \par - continue Xeljanz till start RTX then stop..\par \par - updated labs, will set up home draw \par \par - Stop buspar at any dose \par \par - rto 3 m, and schedule RTX as soon as approved \par \par \par

## 2021-07-28 NOTE — REVIEW OF SYSTEMS
[Lower Ext Edema] : lower extremity edema [As Noted in HPI] : as noted in HPI [Arthralgias] : arthralgias [Joint Pain] : joint pain [Joint Swelling] : joint swelling [Joint Stiffness] : joint stiffness [Anxiety] : anxiety [Depression] : depression [Negative] : Heme/Lymph [FreeTextEntry2] : hot flashes resolved 2 ys ago yr.. otherwise ok  [FreeTextEntry3] : R eye lazy but no visual disturbance and early cataract L, nothing else [FreeTextEntry4] : long standing dental issues no significant sicca  [FreeTextEntry9] :  - but poorly controlled past few mnths with inconsistent tx and stress [FreeTextEntry5] : denies hx DVT, PE, MI/ CVA -occas ... SVT on propranol..  [de-identified] : mild intermittent tingling in fingers/ toes -mild intermittent  [de-identified] : always fatigued, but can't sit still.. anxiety and depression moderate-severe today

## 2021-07-28 NOTE — HISTORY OF PRESENT ILLNESS
[Home] : at home, [unfilled] , at the time of the visit. [Medical Office: ([facility])___] : at the medical office located in  [Verbal consent obtained from patient] : the patient, [unfilled] [FreeTextEntry1] : Verbal consent given on 07/28/2021 at 18:23 by RK JUAREZ, [].\par amwell \par \par - didn't get any of xrays too much going on at home\par - Still tremendous stress with husbands issues, not doing well... \par - citalopram at 20 mg too sedating.. and buspar not helpful and too fatigued... depression/ anxiety persist.\par - RA at this time- most severe L knee severe pain.. with swelling/ warmth and still wrist/ shoulders pain/ limited ROM and stiffness x 30-60 mins, overwhelming fatigue as well.  Continues LEF 10 mg (didn't call, would have increased dose after last visit when unable to taper pred) and Xeljanz, reportedly c/w both\par - L shoulder + response to injection lasted several wks, now pain / swelling and limited ROM returned. \par - functionally still able to work FT but severe financial distress, doesn't have choice \par -labs 5/3/21 stable CBC, CMP, ESR 23, CRP nl \par - on suboxone:  per Dr Jara (chronic pain medication.. but has missed doses.. 2/2 stress and not getting to office (? reportedly- will do istop)\par \par 1) SEropositive RA:  high titer RF/ CCP\par 2) REactive depression/ anxiety, r/t caregiver stress\par __________________________________________________________________________________\par \par This is a 65 y/o woman with hx of HTN, HLD, RA diagnosed many years ago.  \par \par She initially saw Dr. Nino, who told her it could be RA or could be lupus, based on some serologies.  He stopped taking her insurance, so she followed with another Rheumatologist, Dr. Perri Lara.  Last visit was several years ago.  She stopped taking the RA meds because she felt it wasn't helping.\par \par She was previously on \par Prednisone\par Humira\par MTX\par Orencia.\par \par No adverse reactions that she recalls.  \par \par Currently she has pain in hands/wrists/knees/ankles/feet.\par She has AM stiffness of about 2 hours.  \par

## 2021-08-06 RX ORDER — RITUXIMAB 10 MG/ML
500 INJECTION, SOLUTION INTRAVENOUS
Qty: 2 | Refills: 1 | Status: DISCONTINUED | COMMUNITY
Start: 2021-07-28 | End: 2021-08-06

## 2021-08-12 ENCOUNTER — NON-APPOINTMENT (OUTPATIENT)
Age: 66
End: 2021-08-12

## 2021-08-16 LAB
25(OH)D3 SERPL-MCNC: 23.7 NG/ML
ALBUMIN MFR SERPL ELPH: 59.3 %
ALBUMIN SERPL ELPH-MCNC: 4.5 G/DL
ALBUMIN SERPL-MCNC: 4 G/DL
ALBUMIN/GLOB SERPL: 1.4 RATIO
ALP BLD-CCNC: 50 U/L
ALPHA1 GLOB MFR SERPL ELPH: 4 %
ALPHA1 GLOB SERPL ELPH-MCNC: 0.3 G/DL
ALPHA2 GLOB MFR SERPL ELPH: 11.2 %
ALPHA2 GLOB SERPL ELPH-MCNC: 0.8 G/DL
ALT SERPL-CCNC: 19 U/L
ANION GAP SERPL CALC-SCNC: 13 MMOL/L
AST SERPL-CCNC: 20 U/L
B-GLOBULIN MFR SERPL ELPH: 10.4 %
B-GLOBULIN SERPL ELPH-MCNC: 0.7 G/DL
BASOPHILS # BLD AUTO: 0.01 K/UL
BASOPHILS NFR BLD AUTO: 0.3 %
BILIRUB SERPL-MCNC: 0.2 MG/DL
BUN SERPL-MCNC: 15 MG/DL
CALCIUM SERPL-MCNC: 9.5 MG/DL
CALCIUM SERPL-MCNC: 9.5 MG/DL
CHLORIDE SERPL-SCNC: 102 MMOL/L
CO2 SERPL-SCNC: 27 MMOL/L
COVID-19 SPIKE DOMAIN ANTIBODY INTERPRETATION: POSITIVE
CREAT SERPL-MCNC: 0.61 MG/DL
CRP SERPL-MCNC: <3 MG/L
EOSINOPHIL # BLD AUTO: 0.03 K/UL
EOSINOPHIL NFR BLD AUTO: 0.8 %
ERYTHROCYTE [SEDIMENTATION RATE] IN BLOOD BY WESTERGREN METHOD: 6 MM/HR
GAMMA GLOB FLD ELPH-MCNC: 1 G/DL
GAMMA GLOB MFR SERPL ELPH: 15.1 %
GLUCOSE SERPL-MCNC: 55 MG/DL
HAV IGM SER QL: NONREACTIVE
HBV CORE IGM SER QL: NONREACTIVE
HBV SURFACE AG SER QL: NONREACTIVE
HCT VFR BLD CALC: 38.6 %
HCV AB SER QL: NONREACTIVE
HCV S/CO RATIO: 0.12 S/CO
HGB BLD-MCNC: 12.2 G/DL
IGG SUBSET TOTAL IGG: 1165 MG/DL
IGG1 SER-MCNC: 598 MG/DL
IGG2 SER-MCNC: 329 MG/DL
IGG3 SER-MCNC: 42 MG/DL
IGG4 SER-MCNC: 69 MG/DL
IMM GRANULOCYTES NFR BLD AUTO: 0.3 %
INTERPRETATION SERPL IEP-IMP: NORMAL
LYMPHOCYTES # BLD AUTO: 1.48 K/UL
LYMPHOCYTES NFR BLD AUTO: 38.6 %
M TB IFN-G BLD-IMP: NEGATIVE
MAN DIFF?: NORMAL
MCHC RBC-ENTMCNC: 30.2 PG
MCHC RBC-ENTMCNC: 31.6 GM/DL
MCV RBC AUTO: 95.5 FL
MONOCYTES # BLD AUTO: 0.49 K/UL
MONOCYTES NFR BLD AUTO: 12.8 %
NEUTROPHILS # BLD AUTO: 1.81 K/UL
NEUTROPHILS NFR BLD AUTO: 47.2 %
PARATHYROID HORMONE INTACT: 75 PG/ML
PLATELET # BLD AUTO: 184 K/UL
POTASSIUM SERPL-SCNC: 3.8 MMOL/L
PROT SERPL-MCNC: 6.8 G/DL
QUANTIFERON TB PLUS MITOGEN MINUS NIL: >10 IU/ML
QUANTIFERON TB PLUS NIL: 0.05 IU/ML
QUANTIFERON TB PLUS TB1 MINUS NIL: -0.01 IU/ML
QUANTIFERON TB PLUS TB2 MINUS NIL: -0.01 IU/ML
RBC # BLD: 4.04 M/UL
RBC # FLD: 13 %
SARS-COV-2 AB SERPL IA-ACNC: >250 U/ML
SODIUM SERPL-SCNC: 142 MMOL/L
WBC # FLD AUTO: 3.83 K/UL

## 2021-08-26 ENCOUNTER — LABORATORY RESULT (OUTPATIENT)
Age: 66
End: 2021-08-26

## 2021-08-26 DIAGNOSIS — R31.0 GROSS HEMATURIA: ICD-10-CM

## 2021-08-30 ENCOUNTER — LABORATORY RESULT (OUTPATIENT)
Age: 66
End: 2021-08-30

## 2021-09-01 LAB
APPEARANCE: ABNORMAL
BILIRUBIN URINE: NEGATIVE
BLOOD URINE: ABNORMAL
COLOR: ABNORMAL
GLUCOSE QUALITATIVE U: NEGATIVE
KETONES URINE: NEGATIVE
LEUKOCYTE ESTERASE URINE: NEGATIVE
NITRITE URINE: NEGATIVE
PH URINE: 6
PROTEIN URINE: ABNORMAL
SPECIFIC GRAVITY URINE: 1.02
UROBILINOGEN URINE: NORMAL

## 2021-09-20 ENCOUNTER — NON-APPOINTMENT (OUTPATIENT)
Age: 66
End: 2021-09-20

## 2021-10-04 RX ORDER — ACETAMINOPHEN 325 MG/1
325 TABLET ORAL
Qty: 0 | Refills: 0 | Status: COMPLETED | OUTPATIENT
Start: 2021-10-04 | End: 1900-01-01

## 2021-10-04 RX ORDER — METHYLPREDNISOLONE 125 MG/2ML
125 INJECTION, POWDER, LYOPHILIZED, FOR SOLUTION INTRAMUSCULAR; INTRAVENOUS
Qty: 0 | Refills: 0 | Status: COMPLETED | OUTPATIENT
Start: 2021-10-04 | End: 1900-01-01

## 2021-10-04 RX ORDER — DIPHENHYDRAMINE HYDROCHLORIDE 50 MG/ML
50 INJECTION, SOLUTION INTRAMUSCULAR; INTRAVENOUS
Qty: 1 | Refills: 0 | Status: COMPLETED | OUTPATIENT
Start: 2021-10-04 | End: 1900-01-01

## 2021-10-04 RX ORDER — RITUXIMAB-PVVR 500 MG/50ML
500 INJECTION, SOLUTION INTRAVENOUS
Qty: 0 | Refills: 0 | Status: COMPLETED | OUTPATIENT
Start: 2021-10-04 | End: 1900-01-01

## 2021-10-05 ENCOUNTER — APPOINTMENT (OUTPATIENT)
Dept: RHEUMATOLOGY | Facility: CLINIC | Age: 66
End: 2021-10-05

## 2021-10-07 ENCOUNTER — APPOINTMENT (OUTPATIENT)
Dept: RHEUMATOLOGY | Facility: CLINIC | Age: 66
End: 2021-10-07

## 2021-10-26 ENCOUNTER — APPOINTMENT (OUTPATIENT)
Dept: RHEUMATOLOGY | Facility: CLINIC | Age: 66
End: 2021-10-26

## 2021-11-05 ENCOUNTER — RX RENEWAL (OUTPATIENT)
Age: 66
End: 2021-11-05

## 2021-12-14 ENCOUNTER — APPOINTMENT (OUTPATIENT)
Dept: RHEUMATOLOGY | Facility: CLINIC | Age: 66
End: 2021-12-14

## 2022-01-20 ENCOUNTER — APPOINTMENT (OUTPATIENT)
Dept: RHEUMATOLOGY | Facility: CLINIC | Age: 67
End: 2022-01-20

## 2022-02-22 ENCOUNTER — APPOINTMENT (OUTPATIENT)
Dept: RHEUMATOLOGY | Facility: CLINIC | Age: 67
End: 2022-02-22
Payer: COMMERCIAL

## 2022-02-22 VITALS
OXYGEN SATURATION: 98 % | HEART RATE: 68 BPM | WEIGHT: 138 LBS | SYSTOLIC BLOOD PRESSURE: 118 MMHG | DIASTOLIC BLOOD PRESSURE: 72 MMHG | BODY MASS INDEX: 24.45 KG/M2 | TEMPERATURE: 97.6 F

## 2022-02-22 DIAGNOSIS — M54.2 CERVICALGIA: ICD-10-CM

## 2022-02-22 DIAGNOSIS — M17.10 UNILATERAL PRIMARY OSTEOARTHRITIS, UNSPECIFIED KNEE: ICD-10-CM

## 2022-02-22 PROCEDURE — 36415 COLL VENOUS BLD VENIPUNCTURE: CPT

## 2022-02-22 PROCEDURE — 99214 OFFICE O/P EST MOD 30 MIN: CPT | Mod: 25

## 2022-02-22 PROCEDURE — 20610 DRAIN/INJ JOINT/BURSA W/O US: CPT | Mod: LT

## 2022-02-22 RX ORDER — METHYLPRED ACET/NACL,ISO-OS/PF 80 MG/ML
80 VIAL (ML) INJECTION
Qty: 1 | Refills: 0 | Status: COMPLETED | OUTPATIENT
Start: 2022-02-22

## 2022-02-22 RX ADMIN — HYDROCORTISONE SODIUM SUCCINATE 0 MG/ML: 1000 INJECTION, POWDER, FOR SOLUTION INTRAMUSCULAR; INTRAVENOUS at 00:00

## 2022-02-22 NOTE — HISTORY OF PRESENT ILLNESS
[FreeTextEntry1] : 02/22/22 \par - taking prednisone 5 mg "every other day or so," Xeljanz daily, and ran out of leflunomide 2 weeks ago, didn't notice any real changes in arthralgias and no synovitis \par - reporting left knee pain, denies any swelling (cannot remember if she ever had an XR of it before) - reports stiffness/pain increased in the evening time. would like to try steroid injection today. \par - still didn't obtain XRs, worried about financial and time management \par - chronic stress at home: son in rehab (new),  amputation surgery, works at assisted living (activity) \par - 10/2021 - [facility] for blood transfusion for anemia 2/2 profuse hematuria - also dx with bladder cancer stage 0, had tumor removed surgically - no further tx needed \par - weight loss 20 lbs, decreased appetite related to stress. patient never had colonoscopy or cologuard previously\par - not taking citalopram or buspar, was supposed to lower dosages because both were two sedating - but patient didn't understand instructions to lower dose. functionally able to work. taking lexapro 10 mg (prescribed by Dr. Jara) 1 wk ago, reports + response but not complete relief of anxiety, also on suboxone for chronic pain medication. \par - L shoulder + response to injection previously, not bothering her at this time.  \par \par 1) SEropositive RA:  high titer RF/ CCP\par 2) REactive depression/ anxiety, r/t caregiver stress\par __________________________________________________________________________________\par \par This is a 63 y/o woman with hx of HTN, HLD, RA diagnosed many years ago.  \par \par She initially saw Dr. Nino, who told her it could be RA or could be lupus, based on some serologies.  He stopped taking her insurance, so she followed with another Rheumatologist, Dr. Perri Lara.  Last visit was several years ago.  She stopped taking the RA meds because she felt it wasn't helping.\par \par She was previously on \par Prednisone\par Humira\par MTX\par Orencia.\par \par No adverse reactions that she recalls.  \par \par Currently she has pain in hands/wrists/knees/ankles/feet.\par She has AM stiffness of about 2 hours.  \par

## 2022-02-22 NOTE — ASSESSMENT
[FreeTextEntry1] : 67 y/o woman with hx of HTN, HLD, NERISSA/ MDD w/ chronic Rx pain medications, presents for management of her hx of seropositive RA.    \par \par \par 1) RA:  seropositive , CCP > 250 w/ ESR 47 upon initial eval.. Stable tx w/ LEF 20 mg and Xeljanz since 10/20 with overall good response, full control of all synovitis, well tolerated.  Now back to qod prednisone + Xel, unable to taper below 5 mg, with no overt synovitis today. Ran out of LEF 2 weeks ago. (current regimen: LEF + Xel + Pred 5 mg)\par -  Life continues to be very stressful ( recent amputation BKA, multiple complications, still requires care), son in rehab and working FT. \par Needs updated labs done here \par Still need updated xrays but too stressed .. r/o erosive arthropathy, few physical deformities noted on exam\par NOTE: \par Ex-smoker:  stopped 2 ys ago\par denies any hx of DVt/PE/MI/CVA or family hx of Lengthy discussion today regarding bb warning on Xeljanz.  Little to suggest CV risk to include completely nl lipid levels without statins, no smoking hx and no personal of FH of clotting disorders.  Reviewed s/s of MI/ CVA/ TIA/ DVT and advised to report immediately if presents. \par Humira x 1 y then loss efficacy\par MTX ?? not effective or tolerated?\par Orencia x 1 yr then loss efficacy \par Was considering RTX due to difficulty following medical regimen and excellent response to RTX with sister (not necessary now)\par \par 2) Mood disorder:  ys of NERISSA/ MDD intermittent tx in past.  recently started Lexapro 10 mg 1 week ago, some + response\par Sleep ok:  though NRS.. exhausted and falls right to sleep, sleeps through but NRS.. \par NOTE :\par sertraline not effective \par Trial buspar/ hydrozyine for anxiety NOT tolerated (too sedating)  \par Lexapro previously before and citalopram (too sedating) not effective\par Buspar too sedating  \par citalopram 10 mg not effective, increased to 20 mg w/ minimal benefit and too much fatigue.  \par \par 3) Chronic pain syndrome:  likely 2/2 fair/ poorly controlled RA, little on exam to suggest advanced DJD.. \par Longstanding use Suboxone working w/ Dr. Jara.. \par \par 4) Overweight:  BMI 24, improved but due to stress\par \par 5.) Cervicalgia - limited ROM and neck pain throughout the day, requesting some relief \par \par \par Plan:\par - should get updated hand/ knee  xrays and c spine: go to [facility] radiology.. when things settle down. currently too many things going on\par \par - IACS 80 mg L knee today\par \par - continue Lexapro 10 mg as prescribed by Samra \par  \par - Continue prednisone prn 2-3X weekly is ok, go back on Leflunamide 20 mg once refilled.\par \par - continue Xeljanz\par \par - updated labs in office today\par \par - start tizanidine 0.5 tablets - 4 tablet per night, 0.5 tablet in the daytime prn (2 mg tablet)\par \par - rto 4 m\par \par

## 2022-02-22 NOTE — PROCEDURE
[Today's Date:] : Date: [unfilled] [Risks] : risks [Benefits] : benefits [#1 Site: ______] : #1 site identified in the [unfilled] [___ ml Inj] : [unfilled] ~Uml [Betadine] : betadine solution [Alcohol] : alcohol [25 gauge 1.5 inch] : A 25 gauge 1.5 inch needle was used [___ml Steriod Preparation] : [unfilled] ml of steriod preparation  [No Complications] : there were no complications [Instructions Given] : handouts/patient instructions were given to patient [Consent Obtained] : written consent was obtained prior to the procedure and is detailed in the patient's record [Patient] : Prior to the start of the procedure a time out was taken and the identity of the patient was confirmed via name and date of birth with the patient. The correct site and the procedure to be performed were confirmed. The correct side was confirmed if applicable. The availability of the correct equipment was verified [Ethyl Chloride] : ethyl chloride [Patient Instructed to Call] : patient was instructed to call if redness at site, a decrease in range of motion or an increase in pain is noted after procedure. [FreeTextEntry1] : \par ice routinely 20/20 for next 24 hs and call if pain not improved w/ in 72 or  if worsened joint pain, or signs of infection including fevers, chills, redness at site ensues.\par  This is a strong steroid.. can cause anxiety, hungry, irritable, trouble sleeping, rarely causes palpitations or chest pain but if present go to ER and then let me know \par \par

## 2022-02-22 NOTE — REVIEW OF SYSTEMS
[Arthralgias] : arthralgias [Joint Pain] : joint pain [Anxiety] : anxiety [Depression] : depression [Negative] : Heme/Lymph [Feeling Poorly] : feeling poorly [Feeling Tired] : feeling tired [Recent Weight Loss (___ Lbs)] : recent [unfilled] ~Ulb weight loss [As Noted in HPI] : as noted in HPI [Fever] : no fever [Chills] : no chills [Eyesight Problems] : no eyesight problems [Dry Eyes] : no dryness of the eyes [Chest Pain] : no chest pain [Palpitations] : no palpitations [Leg Claudication] : no intermittent leg claudication [Lower Ext Edema] : no lower extremity edema [Shortness Of Breath] : no shortness of breath [Wheezing] : no wheezing [Abdominal Pain] : no abdominal pain [Vomiting] : no vomiting [Joint Swelling] : no joint swelling [Joint Stiffness] : no joint stiffness [Difficulty Walking] : no difficulty walking [FreeTextEntry2] : worse w/ stress [FreeTextEntry3] : R eye lazy but no visual disturbance and early cataract L, nothing else [FreeTextEntry4] : dentures upper  [FreeTextEntry5] : denies hx DVT, PE, MI/ CVA . SVT on propranolol..  [FreeTextEntry8] : hematuria resolved [de-identified] : always fatigued, but can't sit still.. anxiety and depression moderate-severe today

## 2022-02-22 NOTE — PHYSICAL EXAM
[No Focal Deficits] : no focal deficits [Sclera] : the sclera and conjunctiva were normal [Extraocular Movements] : extraocular movements were intact [Outer Ear] : the ears and nose were normal in appearance [Oropharynx] : the oropharynx was normal [Neck Appearance] : the appearance of the neck was normal [] : no respiratory distress [Respiration, Rhythm And Depth] : normal respiratory rhythm and effort [Auscultation Breath Sounds / Voice Sounds] : lungs were clear to auscultation bilaterally [Heart Rate And Rhythm] : heart rate was normal and rhythm regular [Heart Sounds] : normal S1 and S2 [Heart Sounds Gallop] : no gallops [Murmurs] : no murmurs [Heart Sounds Pericardial Friction Rub] : no pericardial rub [Edema] : there was no peripheral edema [Cervical Lymph Nodes Enlarged Posterior Bilaterally] : posterior cervical [Cervical Lymph Nodes Enlarged Anterior Bilaterally] : anterior cervical [Supraclavicular Lymph Nodes Enlarged Bilaterally] : supraclavicular [No CVA Tenderness] : no ~M costovertebral angle tenderness [No Spinal Tenderness] : no spinal tenderness [Skin Color & Pigmentation] : normal skin color and pigmentation [Skin Turgor] : normal skin turgor [Motor Exam] : the motor exam was normal [Oriented To Time, Place, And Person] : oriented to person, place, and time [FreeTextEntry1] : anxiety noted related to caregiver stress

## 2022-03-05 LAB
ALBUMIN SERPL ELPH-MCNC: 4.4 G/DL
ALP BLD-CCNC: 46 U/L
ALT SERPL-CCNC: 17 U/L
ANION GAP SERPL CALC-SCNC: 10 MMOL/L
APPEARANCE: CLEAR
AST SERPL-CCNC: 29 U/L
BASOPHILS # BLD AUTO: 0.02 K/UL
BASOPHILS NFR BLD AUTO: 0.5 %
BILIRUB SERPL-MCNC: 0.4 MG/DL
BILIRUBIN URINE: NEGATIVE
BLOOD URINE: NEGATIVE
BUN SERPL-MCNC: 10 MG/DL
CALCIUM SERPL-MCNC: 9.8 MG/DL
CHLORIDE SERPL-SCNC: 103 MMOL/L
CHOLEST SERPL-MCNC: 188 MG/DL
CO2 SERPL-SCNC: 26 MMOL/L
COLOR: NORMAL
CREAT SERPL-MCNC: 0.54 MG/DL
CRP SERPL-MCNC: <3 MG/L
EOSINOPHIL # BLD AUTO: 0.03 K/UL
EOSINOPHIL NFR BLD AUTO: 0.8 %
ERYTHROCYTE [SEDIMENTATION RATE] IN BLOOD BY WESTERGREN METHOD: 13 MM/HR
GLUCOSE QUALITATIVE U: NEGATIVE
GLUCOSE SERPL-MCNC: 110 MG/DL
HCT VFR BLD CALC: 39.8 %
HDLC SERPL-MCNC: 96 MG/DL
HGB BLD-MCNC: 12.4 G/DL
IMM GRANULOCYTES NFR BLD AUTO: 0 %
KETONES URINE: NEGATIVE
LDLC SERPL CALC-MCNC: 76 MG/DL
LEUKOCYTE ESTERASE URINE: NEGATIVE
LYMPHOCYTES # BLD AUTO: 1.23 K/UL
LYMPHOCYTES NFR BLD AUTO: 32.4 %
MAN DIFF?: NORMAL
MCHC RBC-ENTMCNC: 28.6 PG
MCHC RBC-ENTMCNC: 31.2 GM/DL
MCV RBC AUTO: 91.7 FL
MONOCYTES # BLD AUTO: 0.46 K/UL
MONOCYTES NFR BLD AUTO: 12.1 %
NEUTROPHILS # BLD AUTO: 2.06 K/UL
NEUTROPHILS NFR BLD AUTO: 54.2 %
NITRITE URINE: NEGATIVE
NONHDLC SERPL-MCNC: 91 MG/DL
PH URINE: 7
PLATELET # BLD AUTO: 198 K/UL
POTASSIUM SERPL-SCNC: 4.1 MMOL/L
PROT SERPL-MCNC: 7 G/DL
PROTEIN URINE: NEGATIVE
RBC # BLD: 4.34 M/UL
RBC # FLD: 15.1 %
SODIUM SERPL-SCNC: 140 MMOL/L
SPECIFIC GRAVITY URINE: 1.01
TRIGL SERPL-MCNC: 75 MG/DL
UROBILINOGEN URINE: NORMAL
WBC # FLD AUTO: 3.8 K/UL

## 2022-05-10 ENCOUNTER — APPOINTMENT (OUTPATIENT)
Dept: RHEUMATOLOGY | Facility: CLINIC | Age: 67
End: 2022-05-10

## 2022-06-28 ENCOUNTER — APPOINTMENT (OUTPATIENT)
Dept: RHEUMATOLOGY | Facility: CLINIC | Age: 67
End: 2022-06-28
Payer: COMMERCIAL

## 2022-06-28 VITALS
WEIGHT: 158 LBS | DIASTOLIC BLOOD PRESSURE: 80 MMHG | SYSTOLIC BLOOD PRESSURE: 160 MMHG | TEMPERATURE: 97.9 F | OXYGEN SATURATION: 97 % | HEART RATE: 84 BPM | BODY MASS INDEX: 28 KG/M2 | HEIGHT: 63 IN

## 2022-06-28 PROCEDURE — 99214 OFFICE O/P EST MOD 30 MIN: CPT | Mod: 25

## 2022-06-28 PROCEDURE — 36415 COLL VENOUS BLD VENIPUNCTURE: CPT

## 2022-06-28 PROCEDURE — 20610 DRAIN/INJ JOINT/BURSA W/O US: CPT | Mod: LT

## 2022-06-28 RX ORDER — RITUXIMAB-PVVR 500 MG/50ML
500 INJECTION, SOLUTION INTRAVENOUS
Qty: 2 | Refills: 2 | Status: DISCONTINUED | COMMUNITY
Start: 2021-08-06 | End: 2022-06-28

## 2022-06-28 RX ORDER — TRIAMCINOLONE ACETONIDE 80 MG/ML
80 INJECTION, SUSPENSION INTRA-ARTICULAR; INTRAMUSCULAR
Qty: 1 | Refills: 0 | Status: COMPLETED | OUTPATIENT
Start: 2022-06-28

## 2022-06-28 RX ORDER — CITALOPRAM HYDROBROMIDE 20 MG/1
20 TABLET, FILM COATED ORAL
Qty: 90 | Refills: 1 | Status: DISCONTINUED | COMMUNITY
Start: 2020-09-22 | End: 2022-06-28

## 2022-06-28 RX ORDER — BUPROPION HYDROCHLORIDE 75 MG/1
75 TABLET, FILM COATED ORAL
Qty: 60 | Refills: 2 | Status: DISCONTINUED | COMMUNITY
Start: 2021-07-28 | End: 2022-06-28

## 2022-06-28 RX ADMIN — TRIAMCINOLONE ACETONIDE 0 MG/ML: 80 INJECTION, SUSPENSION INTRA-ARTICULAR; INTRAMUSCULAR at 00:00

## 2022-06-28 NOTE — PROCEDURE
[Today's Date:] : Date: [unfilled] [Risks] : risks [Benefits] : benefits [Alternatives] : alternatives [Consent Obtained] : written consent was obtained prior to the procedure and is detailed in the patient's record [Patient] : Prior to the start of the procedure a time out was taken and the identity of the patient was confirmed via name and date of birth with the patient. The correct site and the procedure to be performed were confirmed. The correct side was confirmed if applicable. The availability of the correct equipment was verified [Therapeutic] : therapeutic [#1 Site: ______] : #1 site identified in the [unfilled] [Ethyl Chloride] : ethyl chloride [___ ml Inj] : [unfilled] ~Uml [1%] : 1%  [Betadine] : betadine solution [Alcohol] : alcohol [25 gauge 1.5 inch] : A 25 gauge 1.5 inch needle was used [___ml Steriod Preparation] : [unfilled] ml of steriod preparation  [Tolerated Well] : the patient tolerated the procedure well [No Complications] : there were no complications [Instructions Given] : handouts/patient instructions were given to patient [Patient Instructed to Call] : patient was instructed to call if redness at site, a decrease in range of motion or an increase in pain is noted after procedure. [FreeTextEntry1] : ice routinely 20/20 for next 24 hs and call if pain not improved w/ in 72 or  if worsened joint pain, or signs of infection including fevers, chills, redness at site ensues.\par  This is a strong steroid.. can cause anxiety, hungry, irritable, trouble sleeping, rarely causes palpitations or chest pain but if present go to ER and then let me know \par \par

## 2022-06-28 NOTE — PHYSICAL EXAM
[Sclera] : the sclera and conjunctiva were normal [Extraocular Movements] : extraocular movements were intact [Outer Ear] : the ears and nose were normal in appearance [Oropharynx] : the oropharynx was normal [Neck Appearance] : the appearance of the neck was normal [Respiration, Rhythm And Depth] : normal respiratory rhythm and effort [Auscultation Breath Sounds / Voice Sounds] : lungs were clear to auscultation bilaterally [Heart Rate And Rhythm] : heart rate was normal and rhythm regular [Heart Sounds] : normal S1 and S2 [Heart Sounds Gallop] : no gallops [Murmurs] : no murmurs [Heart Sounds Pericardial Friction Rub] : no pericardial rub [Edema] : there was no peripheral edema [Cervical Lymph Nodes Enlarged Posterior Bilaterally] : posterior cervical [Cervical Lymph Nodes Enlarged Anterior Bilaterally] : anterior cervical [Supraclavicular Lymph Nodes Enlarged Bilaterally] : supraclavicular [No CVA Tenderness] : no ~M costovertebral angle tenderness [No Spinal Tenderness] : no spinal tenderness [Skin Color & Pigmentation] : normal skin color and pigmentation [Skin Turgor] : normal skin turgor [] : no rash [Motor Exam] : the motor exam was normal [No Focal Deficits] : no focal deficits [Oriented To Time, Place, And Person] : oriented to person, place, and time [FreeTextEntry1] : anxiety noted related to caregiver stress
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
## 2022-06-28 NOTE — REVIEW OF SYSTEMS
[Feeling Poorly] : feeling poorly [Feeling Tired] : feeling tired [Recent Weight Loss (___ Lbs)] : recent [unfilled] ~Ulb weight loss [As Noted in HPI] : as noted in HPI [Arthralgias] : arthralgias [Joint Pain] : joint pain [Anxiety] : anxiety [Depression] : depression [Negative] : Heme/Lymph [Fever] : no fever [Chills] : no chills [Eyesight Problems] : no eyesight problems [Dry Eyes] : no dryness of the eyes [Chest Pain] : no chest pain [Palpitations] : no palpitations [Leg Claudication] : no intermittent leg claudication [Lower Ext Edema] : no lower extremity edema [Shortness Of Breath] : no shortness of breath [Wheezing] : no wheezing [Abdominal Pain] : no abdominal pain [Vomiting] : no vomiting [Joint Swelling] : no joint swelling [Joint Stiffness] : no joint stiffness [Difficulty Walking] : no difficulty walking [FreeTextEntry2] : worse w/ stress [FreeTextEntry3] : R eye lazy but no visual disturbance and early cataract L, nothing else [FreeTextEntry4] : dentures upper  [FreeTextEntry5] : denies hx DVT, PE, MI/ CVA . SVT on propranolol..  [FreeTextEntry8] : hematuria resolved [de-identified] : always fatigued, but can't sit still.. anxiety and depression moderate-severe today

## 2022-06-28 NOTE — ASSESSMENT
[FreeTextEntry1] : 67 y/o woman with hx of HTN, HLD, NERISSA/ MDD w/ chronic Rx pain medications, presents for management of her hx of seropositive RA.    \par \par \par 1) RA:  seropositive , CCP > 250 w/ ESR 47 upon initial eval.. Stable tx w/ LEF 20 mg and Xeljanz since 10/20 with overall good response, full control of all synovitis, well tolerated, but continues 5 mg pred daily..with minimal evidence of synovitis on exam, advised to lower last visit to 4 mg and not done, again reviewed and instructions provided below. Goal is 3 mg or less ideally \par -  Life continues to be very stressful ( recent amputation BKA, multiple complications, still requires care), son in rehab and working FT, another son with legal problems.. tremendous stress 24/7.  \par Needs updated labs done here again today \par Still need updated xrays but too stressed .. r/o erosive arthropathy, few physical deformities noted on exam\par NOTE: \par Ex-smoker:  stopped 2 ys ago\par denies any hx of DVt/PE/MI/CVA or family hx of Lengthy discussion today regarding bb warning on Xeljanz.  Little to suggest CV risk to include completely nl lipid levels without statins, no smoking hx and no personal of FH of clotting disorders.  Reviewed s/s of MI/ CVA/ TIA/ DVT and advised to report immediately if presents. \par Humira x 1 y then loss efficacy\par MTX ?? not effective or tolerated?\par Orencia x 1 yr then loss efficacy \par Was considering RTX due to difficulty following medical regimen and excellent response to RTX with sister (not necessary now)\par \par 2) Mood disorder:  ys of NERISSA/ MDD intermittent tx in past.  Tolerated lexapro but didn't think it did anything and again stopped.  Trial of wellbutrin 75 mg tolerated but never increased.. will retry 1 wk 75 mg and then increase to 150 mg daily.  Likely needs 225 -300 mg... will call in 1 m with response.. Still requiring PRN valium at 5 mg intermittently taken rarely more than 1/ day\par Sleep ok:  though NRS.. exhausted and falls right to sleep, sleeps.  Should consider Sleep study but too stressed to consider \par NOTE :\par sertraline not effective \par Trial buspar/ hydrozyine for anxiety NOT tolerated (too sedating)  \par Lexapro previously before and citalopram (too sedating) not effective\par Buspar too sedating  \par citalopram 10 mg not effective, increased to 20 mg w/ minimal benefit and too much fatigue.  \par \par 3) Chronic pain syndrome:  likely 2/2 fair/ poorly controlled RA, little on exam to suggest advanced DJD.. \par Longstanding use Suboxone working w/ Dr. Jara.. \par \par 4) Overweight:  BMI 24-> 27 now.., improved but due to stress\par \par 5.) Cervicalgia - limited ROM and neck pain throughout the day intermittently, not active issue now. Brief course of Tizanidine w/ + response. Not necessary now\par \par \par Plan:\par - should get updated hand/ knee  xrays and cspine: go to [facility] radiology.. when things settle down. currently too many things going on\par \par - IACS 80 mg L knee today\par \par - remain off lexapro and celexa. Restart wellbutrin (bupropion) at 75 mg x 1wk .. then start 150 mg daily.. \par \par - call my office in 3-4 wks with update, how are you doing on the wellbutrin. We can likely raise this to 225 mg or 300 mg.. \par \par - let me know what happens with bladder \par \par - lower prednisone to 4 mg daily (x 1 m), if ok lower to 3 mg - stay on that dose till next visit \par  \par - Continue  Leflunamide 20 mg and Xeljanz\par \par - updated labs in office today\par \par - PRN use when neck or lower back are painful and interferes with slee..  tizanidine 0.5 tablets - 4 tablet per night, 0.5 tablet in the daytime prn (2 mg tablet)\par \par - rto 4 m\par \par

## 2022-06-29 LAB
25(OH)D3 SERPL-MCNC: 28.8 NG/ML
ALBUMIN SERPL ELPH-MCNC: 4.5 G/DL
ALP BLD-CCNC: 54 U/L
ALT SERPL-CCNC: 19 U/L
ANION GAP SERPL CALC-SCNC: 9 MMOL/L
APPEARANCE: CLEAR
AST SERPL-CCNC: 22 U/L
BASOPHILS # BLD AUTO: 0.02 K/UL
BASOPHILS NFR BLD AUTO: 0.4 %
BILIRUB SERPL-MCNC: 0.4 MG/DL
BILIRUBIN URINE: NEGATIVE
BLOOD URINE: NEGATIVE
BUN SERPL-MCNC: 10 MG/DL
CALCIUM SERPL-MCNC: 9.7 MG/DL
CALCIUM SERPL-MCNC: 9.7 MG/DL
CHLORIDE SERPL-SCNC: 103 MMOL/L
CO2 SERPL-SCNC: 29 MMOL/L
COLOR: COLORLESS
CREAT SERPL-MCNC: 0.6 MG/DL
CRP SERPL-MCNC: <3 MG/L
EGFR: 100 ML/MIN/1.73M2
EOSINOPHIL # BLD AUTO: 0.03 K/UL
EOSINOPHIL NFR BLD AUTO: 0.6 %
ERYTHROCYTE [SEDIMENTATION RATE] IN BLOOD BY WESTERGREN METHOD: 14 MM/HR
GLUCOSE QUALITATIVE U: NEGATIVE
GLUCOSE SERPL-MCNC: 91 MG/DL
HCT VFR BLD CALC: 36.2 %
HGB BLD-MCNC: 11.8 G/DL
IMM GRANULOCYTES NFR BLD AUTO: 0.2 %
KETONES URINE: NEGATIVE
LEUKOCYTE ESTERASE URINE: NEGATIVE
LYMPHOCYTES # BLD AUTO: 1.76 K/UL
LYMPHOCYTES NFR BLD AUTO: 36 %
MAN DIFF?: NORMAL
MCHC RBC-ENTMCNC: 29.4 PG
MCHC RBC-ENTMCNC: 32.6 GM/DL
MCV RBC AUTO: 90.3 FL
MONOCYTES # BLD AUTO: 0.51 K/UL
MONOCYTES NFR BLD AUTO: 10.4 %
NEUTROPHILS # BLD AUTO: 2.56 K/UL
NEUTROPHILS NFR BLD AUTO: 52.4 %
NITRITE URINE: NEGATIVE
PARATHYROID HORMONE INTACT: 72 PG/ML
PH URINE: 7.5
PLATELET # BLD AUTO: 201 K/UL
POTASSIUM SERPL-SCNC: 4 MMOL/L
PROT SERPL-MCNC: 7.5 G/DL
PROTEIN URINE: NEGATIVE
RBC # BLD: 4.01 M/UL
RBC # FLD: 13.2 %
SODIUM SERPL-SCNC: 141 MMOL/L
SPECIFIC GRAVITY URINE: 1.01
UROBILINOGEN URINE: NORMAL
WBC # FLD AUTO: 4.89 K/UL

## 2022-09-24 RX ORDER — BUPROPION HYDROCHLORIDE 75 MG/1
75 TABLET, FILM COATED ORAL
Qty: 7 | Refills: 0 | Status: DISCONTINUED | COMMUNITY
Start: 2022-06-28 | End: 2022-09-24

## 2022-10-04 ENCOUNTER — NON-APPOINTMENT (OUTPATIENT)
Age: 67
End: 2022-10-04

## 2022-10-18 ENCOUNTER — APPOINTMENT (OUTPATIENT)
Dept: RHEUMATOLOGY | Facility: CLINIC | Age: 67
End: 2022-10-18

## 2022-10-18 ENCOUNTER — LABORATORY RESULT (OUTPATIENT)
Age: 67
End: 2022-10-18

## 2022-10-18 VITALS
HEIGHT: 63 IN | SYSTOLIC BLOOD PRESSURE: 140 MMHG | BODY MASS INDEX: 28.17 KG/M2 | DIASTOLIC BLOOD PRESSURE: 80 MMHG | HEART RATE: 78 BPM | TEMPERATURE: 97.5 F | WEIGHT: 159 LBS | OXYGEN SATURATION: 96 %

## 2022-10-18 DIAGNOSIS — Z23 ENCOUNTER FOR IMMUNIZATION: ICD-10-CM

## 2022-10-18 PROCEDURE — 36415 COLL VENOUS BLD VENIPUNCTURE: CPT

## 2022-10-18 PROCEDURE — 99214 OFFICE O/P EST MOD 30 MIN: CPT | Mod: 25

## 2022-10-18 RX ORDER — TOFACITINIB 11 MG/1
11 TABLET, FILM COATED, EXTENDED RELEASE ORAL
Qty: 90 | Refills: 3 | Status: DISCONTINUED | COMMUNITY
Start: 2020-11-18 | End: 2022-10-18

## 2022-10-18 RX ORDER — PREDNISONE 1 MG/1
1 TABLET ORAL
Qty: 360 | Refills: 1 | Status: DISCONTINUED | COMMUNITY
Start: 2020-11-18 | End: 2022-10-18

## 2022-10-18 RX ORDER — BUPRENORPHINE AND NALOXONE 8; 2 MG/1; MG/1
8-2 FILM BUCCAL; SUBLINGUAL
Qty: 2 | Refills: 0 | Status: ACTIVE | COMMUNITY

## 2022-10-18 RX ORDER — MULTIVIT-MIN/IRON/FOLIC ACID/K 18-600-40
CAPSULE ORAL
Refills: 0 | Status: DISCONTINUED | COMMUNITY
End: 2022-10-18

## 2022-10-19 NOTE — DATA REVIEWED
[FreeTextEntry1] : Labs: \par 6/22 nl CMP, CBC, CRP, ESR, UA, Vit D 28 \par 7/20 , CCP > 250 w/ ESR 46, EMANI 1:320H,  nl CBC, CMP,  CRP, C3/4, TSH, PTH, CK, SPEP, PTT w/ neg, EMANI and subserologies to include APS/ LAC, SCL70, MARKIE, ACE, TPO/ TG, tTG, Hep B/ C and neg HsAb, quantiferon gold,  UA\par \par \par

## 2022-10-19 NOTE — HISTORY OF PRESENT ILLNESS
[FreeTextEntry1] : 10/18/2022\par - Bladder cancer is and ongoing issues...she has had 3 bladder surgeries.....just completed 6 weeks of BCG instillation this week with.... \par - Reports increased pain in fingers (very tender), L shoulder, and L medial epicondyle......she also has neurologic pain in her right thigh in the same site she had shingles years ago....Pain in her thigh is so severe it prevents her from walking at times \par - Still takes prednisone 5mg, but only take few times a week\par - Still working full-time and under tremendous stress at home \par - Recieved flu vaccine and is due for pneumonia vaccine \par - continues w/ Xeljanz w/out cv event and LEF 20 mg daily.. \par - chronic pain condition persists, stable meds per PM \par - Continues routine Benzo- Valium 5 mg 1-2 / day.. continues with stable dose suboxone \par - not taking lexapro well tolerated but not effective; celexa both low doses and not effective \par - wellbutrin 75 mg not helpful.. now at 150 mg XL.  \par \par 1) SEropositive RA:  high titer RF/ CCP\par 2) REactive depression/ anxiety, r/t caregiver stress\par __________________________________________________________________________________\par \par This is a 65 y/o woman with hx of HTN, HLD, RA diagnosed many years ago.  \par \par She initially saw Dr. Nino, who told her it could be RA or could be lupus, based on some serologies.  He stopped taking her insurance, so she followed with another Rheumatologist, Dr. Perri Lara.  Last visit was several years ago.  She stopped taking the RA meds because she felt it wasn't helping.\par \par She was previously on \par Prednisone\par Humira\par MTX\par Orencia.\par \par No adverse reactions that she recalls.  \par \par Currently she has pain in hands/wrists/knees/ankles/feet.\par She has AM stiffness of about 2 hours.  \par

## 2022-10-19 NOTE — ASSESSMENT
[FreeTextEntry1] : 68 y/o woman with hx of HTN, HLD, NERISSA/ MDD w/ chronic Rx pain medications, presents for management of her hx of seropositive RA.    \par \par \par 1) RA:  seropositive , CCP > 250 w/ ESR 47 upon initial eval.. Stable tx w/ LEF 20 mg and Xeljanz since 10/20 with overall good response, full control of all synovitis, well tolerated, but continues 5 mg pred daily in past now few times/ wk (improved but increased synovitis as noted) and worse lately following BCG treatment for bladder cancer.  Struggling to get Ca under control.  Given concerns of hypercoag state r/t malignancy and risk malignancy associated with Fanta.. need to consider change in tx.  Had previously suggested RTX but pt declined.  Sister currently doing VERY well on treatment and willing to consider.  Orders placed and advised to stop Xeljanz immediately.  \par -  Life continues to be very stressful ( recent amputation BKA, multiple complications, still requires care), son in rehab and working FT, another son with legal problems.. tremendous stress 24/7.  \par Needs updated labs done here again today \par Still need updated xrays but too stressed .. r/o erosive arthropathy, few physical deformities noted on exam\par NOTE: \par Ex-smoker:  stopped 2 ys ago\par denies any hx of DVt/PE/MI/CVA or family hx of Lengthy discussion today regarding bb warning on Xeljanz.  Little to suggest CV risk to include completely nl lipid levels without statins, no smoking hx and no personal of FH of clotting disorders.  Reviewed s/s of MI/ CVA/ TIA/ DVT and advised to report immediately if presents. \par Humira x 1 y then loss efficacy\par MTX ?? not effective or tolerated?\par Orencia x 1 yr then loss efficacy \par Was considering RTX due to difficulty following medical regimen and excellent response to RTX with sister (not necessary now)\par Xeljanz very effective but concerned about malignancy newly dx bladder cancer not responding to standard tx \par \par 2) Mood disorder:  ys of NERISSA/ MDD intermittent tx in past.  Tolerated lexapro but didn't think it did anything and again stopped.  Trial of wellbutrin 75 mg tolerated but never increased- now at 150 mg, still unclear if this is sufficient but overall doing slightly better.. still feel needs higher dose but not interested in making any change. Likely needs 225 -300 mg..... Still requiring PRN valium at 5 mg intermittently taken rarely more than 1/ day\par Sleep ok:  though NRS.. exhausted and falls right to sleep, sleeps.  Should consider Sleep study but too stressed to consider \par NOTE :\par sertraline not effective \par Trial buspar/ hydrozyine for anxiety NOT tolerated (too sedating)  \par Lexapro previously before and citalopram (too sedating) not effective\par Buspar too sedating  \par citalopram 10 mg not effective, increased to 20 mg w/ minimal benefit and too much fatigue.  \par \par 3) Chronic pain syndrome:  likely 2/2 fair/ poorly controlled RA, little on exam to suggest advanced DJD.. \par Longstanding use Suboxone working w/ Dr. Jara.. \par \par 4) Overweight:  BMI 24-> 27 -> 28 now.., improved but due to stress\par \par 5.) Cervicalgia - limited ROM and neck pain throughout the day intermittently, not active issue now. Brief course of Tizanidine w/ + response. Not necessary now and still occcas valium \par \par 6) Immunosuppression:  long standing use of steroids and other DMARds.. need to check IGG levels before RTX.. acute post herpetic neuralgia increase in location of previous infection, will given course valcyclovir x 7 days for relief. May need to start jose.. \par \par \par \par Plan:\par - Discontinue xeljanz\par \par - Prescribing valacyclovir 1000mg to be taken 3x a day for 7 days \par  \par - Call in one week w/update on pain\par \par - continue wellbutrin at 150 mg daily.. \par \par - let me know what happens with bladder \par  \par - Continue  Leflunamide 20 mg \par \par - updated labs in office today\par \par - need to start RTX, previously approved for [facility], application submitted \par \par - PRN use when neck or lower back are painful and interferes with slee..  tizanidine 0.5 tablets - 4 tablet per night, 0.5 tablet in the daytime prn (2 mg tablet)\par \par - rto 4 m\par \par

## 2022-10-19 NOTE — REVIEW OF SYSTEMS
[Feeling Tired] : feeling tired [As Noted in HPI] : as noted in HPI [Arthralgias] : arthralgias [Joint Pain] : joint pain [Anxiety] : anxiety [Depression] : depression [Negative] : Heme/Lymph [Fever] : no fever [Chills] : no chills [Eyesight Problems] : no eyesight problems [Dry Eyes] : no dryness of the eyes [Chest Pain] : no chest pain [Leg Claudication] : no intermittent leg claudication [Palpitations] : no palpitations [Lower Ext Edema] : no lower extremity edema [Shortness Of Breath] : no shortness of breath [Wheezing] : no wheezing [Abdominal Pain] : no abdominal pain [Vomiting] : no vomiting [Joint Swelling] : no joint swelling [Joint Stiffness] : no joint stiffness [Difficulty Walking] : no difficulty walking [FreeTextEntry2] : worse w/ stress [FreeTextEntry4] : dentures upper  [FreeTextEntry3] : R eye lazy but no visual disturbance and early cataract L, nothing else [FreeTextEntry5] : denies hx DVT, PE, MI/ CVA . SVT on propranolol..  [FreeTextEntry8] : hematuria resolved [de-identified] : always fatigued, but can't sit still.. anxiety and depression moderate-severe improved today

## 2022-10-24 LAB
ALBUMIN SERPL ELPH-MCNC: 4.2 G/DL
ALP BLD-CCNC: 64 U/L
ALT SERPL-CCNC: 19 U/L
ANION GAP SERPL CALC-SCNC: 8 MMOL/L
AST SERPL-CCNC: 22 U/L
BASOPHILS # BLD AUTO: 0 K/UL
BASOPHILS NFR BLD AUTO: 0 %
BILIRUB SERPL-MCNC: 0.5 MG/DL
BUN SERPL-MCNC: 9 MG/DL
CALCIUM SERPL-MCNC: 9.7 MG/DL
CALCIUM SERPL-MCNC: 9.7 MG/DL
CHLORIDE SERPL-SCNC: 105 MMOL/L
CO2 SERPL-SCNC: 29 MMOL/L
CREAT SERPL-MCNC: 0.66 MG/DL
CRP SERPL-MCNC: <3 MG/L
DEPRECATED KAPPA LC FREE/LAMBDA SER: 1.28 RATIO
EGFR: 97 ML/MIN/1.73M2
EOSINOPHIL # BLD AUTO: 0 K/UL
EOSINOPHIL NFR BLD AUTO: 0 %
ERYTHROCYTE [SEDIMENTATION RATE] IN BLOOD BY WESTERGREN METHOD: 55 MM/HR
GLUCOSE SERPL-MCNC: 111 MG/DL
HAV IGM SER QL: NONREACTIVE
HBV CORE IGM SER QL: NONREACTIVE
HBV SURFACE AG SER QL: NONREACTIVE
HCT VFR BLD CALC: 35.8 %
HCV AB SER QL: NONREACTIVE
HCV S/CO RATIO: 0.1 S/CO
HGB BLD-MCNC: 11.8 G/DL
IGA SER QL IEP: 135 MG/DL
IGG SER QL IEP: 1079 MG/DL
IGM SER QL IEP: 92 MG/DL
KAPPA LC CSF-MCNC: 2.16 MG/DL
KAPPA LC SERPL-MCNC: 2.77 MG/DL
LYMPHOCYTES # BLD AUTO: 1.1 K/UL
LYMPHOCYTES NFR BLD AUTO: 33.9 %
M TB IFN-G BLD-IMP: NEGATIVE
MAN DIFF?: NORMAL
MCHC RBC-ENTMCNC: 29.3 PG
MCHC RBC-ENTMCNC: 33 GM/DL
MCV RBC AUTO: 88.8 FL
MONOCYTES # BLD AUTO: 0.28 K/UL
MONOCYTES NFR BLD AUTO: 8.7 %
NEUTROPHILS # BLD AUTO: 1.86 K/UL
NEUTROPHILS NFR BLD AUTO: 57.4 %
PARATHYROID HORMONE INTACT: 82 PG/ML
PLATELET # BLD AUTO: 152 K/UL
POTASSIUM SERPL-SCNC: 4.5 MMOL/L
PROT SERPL-MCNC: 6.8 G/DL
QUANTIFERON TB PLUS MITOGEN MINUS NIL: 9.72 IU/ML
QUANTIFERON TB PLUS NIL: 0.04 IU/ML
QUANTIFERON TB PLUS TB1 MINUS NIL: 0 IU/ML
QUANTIFERON TB PLUS TB2 MINUS NIL: 0 IU/ML
RBC # BLD: 4.03 M/UL
RBC # FLD: 13.1 %
SODIUM SERPL-SCNC: 143 MMOL/L
WBC # FLD AUTO: 3.24 K/UL

## 2022-10-27 ENCOUNTER — NON-APPOINTMENT (OUTPATIENT)
Age: 67
End: 2022-10-27

## 2022-11-23 ENCOUNTER — NON-APPOINTMENT (OUTPATIENT)
Age: 67
End: 2022-11-23

## 2022-11-23 DIAGNOSIS — G47.00 INSOMNIA, UNSPECIFIED: ICD-10-CM

## 2022-11-29 ENCOUNTER — NON-APPOINTMENT (OUTPATIENT)
Age: 67
End: 2022-11-29

## 2023-01-05 RX ORDER — RITUXIMAB-PVVR 500 MG/50ML
500 INJECTION, SOLUTION INTRAVENOUS
Qty: 0 | Refills: 0 | Status: COMPLETED | OUTPATIENT
Start: 2023-01-04 | End: 1900-01-01

## 2023-01-05 RX ORDER — ACETAMINOPHEN 325 MG/1
325 TABLET ORAL
Qty: 0 | Refills: 0 | Status: COMPLETED | OUTPATIENT
Start: 2023-01-04 | End: 1900-01-01

## 2023-01-05 RX ORDER — DIPHENHYDRAMINE HYDROCHLORIDE 50 MG/ML
50 INJECTION, SOLUTION INTRAMUSCULAR; INTRAVENOUS
Qty: 1 | Refills: 0 | Status: COMPLETED | OUTPATIENT
Start: 2023-01-04 | End: 1900-01-01

## 2023-01-09 ENCOUNTER — NON-APPOINTMENT (OUTPATIENT)
Age: 68
End: 2023-01-09

## 2023-02-14 ENCOUNTER — APPOINTMENT (OUTPATIENT)
Dept: RHEUMATOLOGY | Facility: CLINIC | Age: 68
End: 2023-02-14
Payer: COMMERCIAL

## 2023-02-14 VITALS
WEIGHT: 146 LBS | BODY MASS INDEX: 25.86 KG/M2 | OXYGEN SATURATION: 97 % | DIASTOLIC BLOOD PRESSURE: 70 MMHG | HEART RATE: 77 BPM | SYSTOLIC BLOOD PRESSURE: 118 MMHG | TEMPERATURE: 97.9 F

## 2023-02-14 PROCEDURE — 99214 OFFICE O/P EST MOD 30 MIN: CPT

## 2023-02-14 RX ORDER — HYDROMORPHONE HYDROCHLORIDE 2 MG/1
2 TABLET ORAL
Qty: 20 | Refills: 0 | Status: DISCONTINUED | COMMUNITY
Start: 2022-08-01 | End: 2023-02-14

## 2023-02-14 RX ORDER — DIAZEPAM 5 MG/1
5 TABLET ORAL
Qty: 30 | Refills: 0 | Status: DISCONTINUED | COMMUNITY
Start: 2022-06-06 | End: 2023-02-14

## 2023-02-14 NOTE — PHYSICAL EXAM
[Sclera] : the sclera and conjunctiva were normal [Extraocular Movements] : extraocular movements were intact [Outer Ear] : the ears and nose were normal in appearance [Oropharynx] : the oropharynx was normal [Neck Appearance] : the appearance of the neck was normal [Respiration, Rhythm And Depth] : normal respiratory rhythm and effort [Auscultation Breath Sounds / Voice Sounds] : lungs were clear to auscultation bilaterally [Heart Rate And Rhythm] : heart rate was normal and rhythm regular [Heart Sounds] : normal S1 and S2 [Heart Sounds Gallop] : no gallops [Murmurs] : no murmurs [Heart Sounds Pericardial Friction Rub] : no pericardial rub [Cervical Lymph Nodes Enlarged Posterior Bilaterally] : posterior cervical [Cervical Lymph Nodes Enlarged Anterior Bilaterally] : anterior cervical [Supraclavicular Lymph Nodes Enlarged Bilaterally] : supraclavicular [No CVA Tenderness] : no ~M costovertebral angle tenderness [No Spinal Tenderness] : no spinal tenderness [Skin Color & Pigmentation] : normal skin color and pigmentation [Skin Turgor] : normal skin turgor [] : no rash [Motor Exam] : the motor exam was normal [No Focal Deficits] : no focal deficits [Oriented To Time, Place, And Person] : oriented to person, place, and time [FreeTextEntry1] : anxiety noted related to caregiver stress- better lately

## 2023-02-14 NOTE — HISTORY OF PRESENT ILLNESS
[FreeTextEntry1] : 2/14/2023\par \par - did not start Rituxan........due to start in the middle of March and 2nd dose two weeks from then\par - on leflunomide....no longer on xeljanz has been off of it since 1/23 w/ no acute change in synovitis at this point beyond mils fingers and toes are achy since d/c xeljanz....Legs swell intermittently  \par - all pain medication is stable \par - R thigh pain still persists but found valacyclovir to be effective\par - bladder cancer seem to be resolved for now....no new cancer cells were found ......f/u routinely w/urologist or oncologist every 3-4 m, ok with use of RTx for RA \par - stable suboxone.. w/ BID diazepam 5 mg\par - not taking lexapro well tolerated but not effective; celexa both low doses and not effective \par - wellbutrin 75 mg not helpful.. now at 150 mg XL, tolerated well.. no change needed\par \par 1) SEropositive RA:  high titer RF/ CCP\par 2) REactive depression/ anxiety, r/t caregiver stress\par __________________________________________________________________________________\par \par This is a 65 y/o woman with hx of HTN, HLD, RA diagnosed many years ago.  \par \par She initially saw Dr. Nino, who told her it could be RA or could be lupus, based on some serologies.  He stopped taking her insurance, so she followed with another Rheumatologist, Dr. Perri Lara.  Last visit was several years ago.  She stopped taking the RA meds because she felt it wasn't helping.\par \par She was previously on \par Prednisone\par Humira\par MTX\par Orencia.\par \par No adverse reactions that she recalls.  \par \par Currently she has pain in hands/wrists/knees/ankles/feet.\par She has AM stiffness of about 2 hours.  \par

## 2023-02-14 NOTE — REVIEW OF SYSTEMS
[Feeling Tired] : feeling tired [As Noted in HPI] : as noted in HPI [Arthralgias] : arthralgias [Joint Pain] : joint pain [Anxiety] : anxiety [Depression] : depression [Negative] : Heme/Lymph [Fever] : no fever [Chills] : no chills [Eyesight Problems] : no eyesight problems [Dry Eyes] : no dryness of the eyes [Chest Pain] : no chest pain [Palpitations] : no palpitations [Leg Claudication] : no intermittent leg claudication [Lower Ext Edema] : no lower extremity edema [Shortness Of Breath] : no shortness of breath [Wheezing] : no wheezing [Abdominal Pain] : no abdominal pain [Vomiting] : no vomiting [Joint Swelling] : no joint swelling [Joint Stiffness] : no joint stiffness [Difficulty Walking] : no difficulty walking [FreeTextEntry2] : worse w/ stress- chronic  [FreeTextEntry3] : R eye lazy but no visual disturbance and early cataract L, nothing else [FreeTextEntry4] : dentures upper  [FreeTextEntry5] : denies hx DVT, PE, MI/ CVA . SVT on propranolol..  [FreeTextEntry8] : hematuria resolved- bladder cancer in remission [FreeTextEntry9] : achy joints but no overt synovits [de-identified] : always fatigued, but can't sit still.. anxiety and depression moderate-severe improved today

## 2023-02-14 NOTE — ASSESSMENT
[FreeTextEntry1] : 68 y/o woman with hx of HTN, HLD, NERISSA/ MDD w/ chronic Rx pain medications, presents for management of her hx of seropositive RA.  \par Bladder Cancer 2022- remission requiring BCG tx    \par \par \par 1) RA:  seropositive , CCP > 250 w/ ESR 47 upon initial eval.. Stable tx w/ LEF 20 mg and Xeljanz since 10/20 with overall good response, full control of all synovitis, well tolerated, but unable to taper off steroids.. continued  5 mg pred daily- eventually tapered off 10/22 but ESR/ CRP continued to be high with ongoing pain - though synovitis was minimal . \par Xeljanz concern for hypercoag state r/t bladder cancer.. now off and actually fairly good though increased "Diffuse joint pain" ... \par Scheduled for dose of RTX.. to start in 3 wks.  fs 1000 mg x 2 doses.  \par -  Life continues to be very stressful ( recent amputation BKA, multiple complications, still requires care), son in rehab and working FT, another son with legal problems.. tremendous stress 24/7.  \par Still need updated xrays but too stressed .. r/o erosive arthropathy (knees and hands), few physical deformities noted on exam\par NOTE: \par Ex-smoker:  stopped 2 ys ago\par denies any hx of DVt/PE/MI/CVA or family hx of Lengthy discussion today regarding bb warning on Xeljanz.  Little to suggest CV risk to include completely nl lipid levels without statins, no smoking hx and no personal of FH of clotting disorders.  Reviewed s/s of MI/ CVA/ TIA/ DVT and advised to report immediately if presents. \par Humira x 1 y then loss efficacy\par MTX ?? not effective or tolerated?\par Orencia x 1 yr then loss efficacy \par Was considering RTX due to difficulty following medical regimen and excellent response to RTX with sister (not necessary now)\par Xeljanz very effective but concerned about malignancy newly dx bladder cancer not responding to standard tx \par \par 2) Mood disorder:  ys of NERISSA/ MDD intermittent tx in past.  Tolerated lexapro but didn't think it did anything and again stopped.  Trial of wellbutrin 75 mg tolerated but never increased- now at 150 mg, still unclear if this is sufficient but overall doing slightly better.. still feel needs higher dose but not interested in making any change. Likely needs 225 -300 mg..... Still requiring valium at 5 mg QD-BID  intermittently taken rarely more than 1/ day\par Sleep ok:  though NRS.. exhausted and falls right to sleep, sleeps.  Should consider Sleep study but too stressed to consider \par NOTE :\par sertraline not effective \par Trial buspar/ hydrozyine for anxiety NOT tolerated (too sedating)  \par Lexapro previously before and citalopram (too sedating) not effective\par Buspar too sedating  \par citalopram 10 mg not effective, increased to 20 mg w/ minimal benefit and too much fatigue.  \par \par 3) Chronic pain syndrome:  likely 2/2 fair/ poorly controlled RA, little on exam to suggest advanced DJD.. \par Longstanding use Suboxone working w/ Dr. Jara.. \par \par 4) Overweight:  BMI 24-> 27 -> 28 now.25 , improved but due to stress\par \par 5.) Cervicalgia - limited ROM and neck pain throughout the day intermittently, not active issue now. Brief course of Tizanidine w/ + response. Not necessary now and still occcas valium \par \par 6) Immunosuppression:  long standing use of steroids and other DMARds.. need to check IGG levels (10/22 IGG 1079) before RTX.. acute post herpetic neuralgia increase in location of previous infection will try Gabapentin at higher dose 200 mg too low though tolerated, did not help pain... \par \par \par \par Plan:\par \par - complete labs no \par \par - get shingrix vaccinations\par \par - increasing gabapentin to 300 mg may take 1-4 at HS \par \par - continue wellbutrin at 150 mg daily.. \par  \par - Continue  Leflunomide 20 mg \par \par - complete XR of bl hands and knees\par \par - continue valacyclovir 1000mg as needed\par \par - RTO in 4 months \par \par \par - need to start RTX, previously approved for [facility], application submitted \par \par - PRN use when neck or lower back are painful and interferes with slee..  tizanidine 0.5 tablets - 4 tablet per night, 0.5 tablet in the daytime prn (2 mg tablet)\par \par - rto 4 m\par \par

## 2023-03-04 LAB
ALBUMIN SERPL ELPH-MCNC: 4.1 G/DL
ALP BLD-CCNC: 61 U/L
ALT SERPL-CCNC: 15 U/L
ANION GAP SERPL CALC-SCNC: 9 MMOL/L
AST SERPL-CCNC: 19 U/L
BASOPHILS # BLD AUTO: 0.02 K/UL
BASOPHILS NFR BLD AUTO: 0.5 %
BILIRUB SERPL-MCNC: 0.4 MG/DL
BUN SERPL-MCNC: 11 MG/DL
CALCIUM SERPL-MCNC: 9.9 MG/DL
CHLORIDE SERPL-SCNC: 103 MMOL/L
CK SERPL-CCNC: 47 U/L
CO2 SERPL-SCNC: 27 MMOL/L
CREAT SERPL-MCNC: 0.56 MG/DL
CRP SERPL-MCNC: <3 MG/L
EGFR: 101 ML/MIN/1.73M2
EOSINOPHIL # BLD AUTO: 0.03 K/UL
EOSINOPHIL NFR BLD AUTO: 0.8 %
ERYTHROCYTE [SEDIMENTATION RATE] IN BLOOD BY WESTERGREN METHOD: 55 MM/HR
GLUCOSE SERPL-MCNC: 106 MG/DL
HCT VFR BLD CALC: 38.4 %
HGB BLD-MCNC: 12.1 G/DL
IMM GRANULOCYTES NFR BLD AUTO: 0.3 %
LYMPHOCYTES # BLD AUTO: 0.96 K/UL
LYMPHOCYTES NFR BLD AUTO: 26 %
MAN DIFF?: NORMAL
MCHC RBC-ENTMCNC: 28.1 PG
MCHC RBC-ENTMCNC: 31.5 GM/DL
MCV RBC AUTO: 89.1 FL
MONOCYTES # BLD AUTO: 0.43 K/UL
MONOCYTES NFR BLD AUTO: 11.7 %
NEUTROPHILS # BLD AUTO: 2.24 K/UL
NEUTROPHILS NFR BLD AUTO: 60.7 %
PLATELET # BLD AUTO: 175 K/UL
POTASSIUM SERPL-SCNC: 4.6 MMOL/L
PROT SERPL-MCNC: 6.9 G/DL
RBC # BLD: 4.31 M/UL
RBC # FLD: 12.9 %
SODIUM SERPL-SCNC: 140 MMOL/L
WBC # FLD AUTO: 3.69 K/UL

## 2023-03-27 RX ORDER — METHYLPRED ACET/NACL,ISO-OS/PF 40 MG/ML
40 VIAL (ML) INJECTION
Qty: 1 | Refills: 0 | Status: COMPLETED | OUTPATIENT
Start: 2023-03-27 | End: 1900-01-01

## 2023-05-03 RX ORDER — RITUXIMAB-PVVR 500 MG/50ML
500 INJECTION, SOLUTION INTRAVENOUS
Qty: 2 | Refills: 2 | Status: ACTIVE | COMMUNITY
Start: 2022-10-18

## 2023-05-16 ENCOUNTER — APPOINTMENT (OUTPATIENT)
Dept: RHEUMATOLOGY | Facility: CLINIC | Age: 68
End: 2023-05-16
Payer: COMMERCIAL

## 2023-05-16 VITALS
HEART RATE: 75 BPM | DIASTOLIC BLOOD PRESSURE: 73 MMHG | OXYGEN SATURATION: 97 % | SYSTOLIC BLOOD PRESSURE: 123 MMHG | RESPIRATION RATE: 18 BRPM

## 2023-05-16 VITALS
DIASTOLIC BLOOD PRESSURE: 80 MMHG | RESPIRATION RATE: 18 BRPM | SYSTOLIC BLOOD PRESSURE: 159 MMHG | HEART RATE: 74 BPM | TEMPERATURE: 97.7 F | OXYGEN SATURATION: 97 %

## 2023-05-16 VITALS
HEART RATE: 76 BPM | RESPIRATION RATE: 18 BRPM | SYSTOLIC BLOOD PRESSURE: 135 MMHG | DIASTOLIC BLOOD PRESSURE: 78 MMHG | OXYGEN SATURATION: 96 %

## 2023-05-16 PROCEDURE — 96374 THER/PROPH/DIAG INJ IV PUSH: CPT | Mod: 59

## 2023-05-16 PROCEDURE — 96413 CHEMO IV INFUSION 1 HR: CPT

## 2023-05-16 PROCEDURE — 96415 CHEMO IV INFUSION ADDL HR: CPT

## 2023-05-16 PROCEDURE — 96375 TX/PRO/DX INJ NEW DRUG ADDON: CPT | Mod: 59

## 2023-05-16 RX ORDER — ACETAMINOPHEN 325 MG/1
325 TABLET ORAL
Qty: 0 | Refills: 0 | Status: COMPLETED | OUTPATIENT
Start: 2023-01-05

## 2023-05-16 RX ORDER — DIPHENHYDRAMINE HYDROCHLORIDE 50 MG/ML
50 INJECTION, SOLUTION INTRAMUSCULAR; INTRAVENOUS
Qty: 1 | Refills: 0 | Status: COMPLETED | OUTPATIENT
Start: 2023-01-05

## 2023-05-16 RX ORDER — RITUXIMAB-PVVR 500 MG/50ML
500 INJECTION, SOLUTION INTRAVENOUS
Qty: 0 | Refills: 0 | Status: COMPLETED | OUTPATIENT
Start: 2023-01-18

## 2023-05-30 ENCOUNTER — APPOINTMENT (OUTPATIENT)
Dept: RHEUMATOLOGY | Facility: CLINIC | Age: 68
End: 2023-05-30
Payer: COMMERCIAL

## 2023-05-30 VITALS
OXYGEN SATURATION: 98 % | RESPIRATION RATE: 17 BRPM | TEMPERATURE: 98 F | DIASTOLIC BLOOD PRESSURE: 79 MMHG | HEART RATE: 78 BPM | SYSTOLIC BLOOD PRESSURE: 163 MMHG

## 2023-05-30 VITALS
HEART RATE: 65 BPM | SYSTOLIC BLOOD PRESSURE: 117 MMHG | DIASTOLIC BLOOD PRESSURE: 70 MMHG | RESPIRATION RATE: 17 BRPM | OXYGEN SATURATION: 95 %

## 2023-05-30 VITALS
DIASTOLIC BLOOD PRESSURE: 64 MMHG | OXYGEN SATURATION: 97 % | HEART RATE: 80 BPM | SYSTOLIC BLOOD PRESSURE: 105 MMHG | RESPIRATION RATE: 18 BRPM

## 2023-05-30 PROCEDURE — 96413 CHEMO IV INFUSION 1 HR: CPT

## 2023-05-30 PROCEDURE — 96375 TX/PRO/DX INJ NEW DRUG ADDON: CPT | Mod: 59

## 2023-05-30 PROCEDURE — 96374 THER/PROPH/DIAG INJ IV PUSH: CPT | Mod: 59

## 2023-05-30 PROCEDURE — 96415 CHEMO IV INFUSION ADDL HR: CPT

## 2023-05-30 RX ORDER — ACETAMINOPHEN 325 MG/1
325 TABLET ORAL
Qty: 0 | Refills: 0 | Status: COMPLETED | OUTPATIENT
Start: 2023-01-18

## 2023-05-30 RX ORDER — RITUXIMAB-PVVR 500 MG/50ML
500 INJECTION, SOLUTION INTRAVENOUS
Qty: 0 | Refills: 0 | Status: COMPLETED | OUTPATIENT
Start: 2023-01-05

## 2023-05-30 RX ORDER — DIPHENHYDRAMINE HYDROCHLORIDE 50 MG/ML
50 INJECTION, SOLUTION INTRAMUSCULAR; INTRAVENOUS
Qty: 1 | Refills: 0 | Status: COMPLETED | OUTPATIENT
Start: 2023-01-18

## 2023-05-30 NOTE — HISTORY OF PRESENT ILLNESS
[N/A] : N/A [Denies] : Denies [No] : No [Yes] : Yes [Declined] : Declined [TB] : Tuberculosis screening [Hep acute panel] : Hepatitis acute panel [Right upper extremity] : Right upper extremity [22g] : 22g [Medication Name: ___] : Medication Name: [unfilled] [Start Time: ___] : Medication Start Time: [unfilled] [Patient  instructed to seek medical attention with signs and symptoms of adverse effects] : Patient  instructed to seek medical attention with signs and symptoms of adverse effects [Patient left unit in no acute distress] : Patient left unit in no acute distress [Medications administered as ordered and tolerated well.] : Medications administered as ordered and tolerated well. [End Time: ___] : Medication End Time: [unfilled] [IV discontinued. Intact. No signs or symptoms of IV complications noted. Time: ___] : IV discontinued. Intact. No signs or symptoms of IV complications noted. Time: [unfilled] [de-identified] : 10:50 am

## 2023-06-27 ENCOUNTER — APPOINTMENT (OUTPATIENT)
Dept: RHEUMATOLOGY | Facility: CLINIC | Age: 68
End: 2023-06-27
Payer: COMMERCIAL

## 2023-06-27 VITALS
HEART RATE: 79 BPM | OXYGEN SATURATION: 92 % | HEIGHT: 63 IN | WEIGHT: 152 LBS | SYSTOLIC BLOOD PRESSURE: 142 MMHG | TEMPERATURE: 97.9 F | BODY MASS INDEX: 26.93 KG/M2 | DIASTOLIC BLOOD PRESSURE: 102 MMHG

## 2023-06-27 DIAGNOSIS — F32.A ANXIETY DISORDER, UNSPECIFIED: ICD-10-CM

## 2023-06-27 DIAGNOSIS — F41.9 ANXIETY DISORDER, UNSPECIFIED: ICD-10-CM

## 2023-06-27 DIAGNOSIS — R45.7 STATE OF EMOTIONAL SHOCK AND STRESS, UNSPECIFIED: ICD-10-CM

## 2023-06-27 PROCEDURE — 99214 OFFICE O/P EST MOD 30 MIN: CPT

## 2023-07-03 NOTE — PHYSICAL EXAM
[Sclera] : the sclera and conjunctiva were normal [Extraocular Movements] : extraocular movements were intact [Outer Ear] : the ears and nose were normal in appearance [Oropharynx] : the oropharynx was normal [Neck Appearance] : the appearance of the neck was normal [Respiration, Rhythm And Depth] : normal respiratory rhythm and effort [Auscultation Breath Sounds / Voice Sounds] : lungs were clear to auscultation bilaterally [Heart Rate And Rhythm] : heart rate was normal and rhythm regular [Heart Sounds] : normal S1 and S2 [Heart Sounds Gallop] : no gallops [Murmurs] : no murmurs [Heart Sounds Pericardial Friction Rub] : no pericardial rub [Cervical Lymph Nodes Enlarged Posterior Bilaterally] : posterior cervical [Cervical Lymph Nodes Enlarged Anterior Bilaterally] : anterior cervical [Supraclavicular Lymph Nodes Enlarged Bilaterally] : supraclavicular [No CVA Tenderness] : no ~M costovertebral angle tenderness [No Spinal Tenderness] : no spinal tenderness [Skin Color & Pigmentation] : normal skin color and pigmentation [] : no rash [Skin Turgor] : normal skin turgor [Motor Exam] : the motor exam was normal [No Focal Deficits] : no focal deficits [Oriented To Time, Place, And Person] : oriented to person, place, and time [FreeTextEntry1] : anxiety noted related to caregiver stress- better lately ( doing very well)

## 2023-07-03 NOTE — ASSESSMENT
[FreeTextEntry1] : 66 y/o woman with hx of HTN, HLD, NERISSA/ MDD w/ chronic Rx pain medications, presents for management of her hx of seropositive RA.  \par Bladder Cancer 2022- remission requiring BCG tx    \par \par \par 1) RA:  seropositive , CCP > 250 w/ ESR 47 upon initial eval.. Switched to RTX 5/23 full dose given and stopped LEF as well, has been taking 5 mg pred 3 x/ wk ever since but feeling well.. \par Should resume LEF initially at 20 mg and once off steroids, if still doing well will decrease to 10 mg.. \par Tolerated RTX well with no complications/ infections or other issues.  Updated labs needed now though..  \par -  Life finally better but continues to work FT and still some stress.. son's legal issues.  doing very well s/p amputation..  \par Still need updated xrays but too stressed (repeatedly requested and not done.. ) .. r/o erosive arthropathy (knees and hands), few physical deformities noted on exam\par NOTE: \par Xeljanz concern for hypercoag state r/t bladder cancer. (though no previous issues).. was switched.  Had been fairly good control, though not as good as RTX . \par Ex-smoker:  stopped 2 ys ago\par denies any hx of DVt/PE/MI/CVA or family hx of Lengthy discussion today regarding bb warning on Xeljanz.  Little to suggest CV risk to include completely nl lipid levels without statins, no smoking hx and no personal of FH of clotting disorders.  Reviewed s/s of MI/ CVA/ TIA/ DVT and advised to report immediately if presents. \par Humira x 1 y then loss efficacy\par MTX ?? not effective or tolerated?\par Orencia x 1 yr then loss efficacy \par Was considering RTX due to difficulty following medical regimen and excellent response to RTX with sister (not necessary now)\par Xeljanz very effective but concerned about malignancy newly dx bladder cancer not responding to standard tx \par \par 2) Mood disorder/ Chronic pain:  ys of NERISSA/ MDD intermittent tx in past.  Tolerated lexapro but didn't think it did anything and again stopped.  Trial of wellbutrin 75 mg tolerated but never increased- now at 150 mg, still unclear if this is sufficient but overall doing slightly better.. still feel needs higher dose but not interested in making any change. Likely needs 225 -300 mg..... Still requiring valium at 5 mg QD-BID, and suboxone 8-2 once dailly, gabapentin stable dose 300-600 mg at HS. .\par Sleep ok:  though NRS.. exhausted and falls right to sleep, sleeps.  Should consider Sleep study but too stressed to consider \par NOTE :\par sertraline not effective \par Trial buspar/ hydrozyine for anxiety NOT tolerated (too sedating)  \par Lexapro previously before and citalopram (too sedating) not effective\par Buspar too sedating  \par citalopram 10 mg not effective, increased to 20 mg w/ minimal benefit and too much fatigue.  \par \par 3) Chronic pain syndrome:  likely 2/2 fair/ poorly controlled RA, little on exam to suggest advanced DJD.. \par Longstanding use Suboxone working w/ Dr. Jara.. (see above)\par \par 4) Overweight:  BMI 24-> 27 -> 28 now.25 , improved but due to stress\par \par 5.) Cervicalgia - limited ROM and neck pain throughout the day intermittently, not active issue now. Brief course of Tizanidine w/ + response. Not necessary now and still occcas valium. Stable \par \par 6) Immunosuppression:  long standing use of steroids and other DMARds.. need to check IGG levels (10/22 IGG 1079) before RTX.. acute post herpetic neuralgia increase in location of previous infection will try Gabapentin at higher dose 200 mg too low though tolerated, did not help pain... \par \par \par \par Plan:\par \par - complete labs before next visit  \par \par - restart leflunomide at 20 mg, ideally lower dose if tolerates tapering off prednsione \par \par - continue taking prednisone as needed.....for now, try to d/c .. ideally off completely within next mnth\par \par - get shingles vaccine 10/2023....knows to provide documents\par \par - complete mammography and repeat DEXA \par \par - next dose of Rituxan is 11/2023\par \par - continue gabapentin 300 mg may take 1-4 at HS \par \par - continue wellbutrin at 150 mg daily.. \par \par - complete XR of bl hands and knees (again requested)\par \par - continue valacyclovir 1000mg as needed\par \par - RTO in 4 months \par \par \par - need to start RTX, previously approved for [employer], application submitted \par \par - PRN use when neck or lower back are painful and interferes with slee..  tizanidine 0.5 tablets - 4 tablet per night, 0.5 tablet in the daytime prn (2 mg tablet)\par \par - rto 4 m\par \par

## 2023-07-03 NOTE — HISTORY OF PRESENT ILLNESS
[FreeTextEntry1] : 6/27/2023\par \par - no longer on xeljanz and has completed two doses of rituxan....most recent dose being may 2023 \par - reports feeling much better w/ some joint pain intermittently mainly in her hands but not associated w/ stiffness or swelling, overall marked improvement but  - still takes 5 mg of prednisone three times a week as needed\par - has not been taking her leflunomide because she was unsure if she was suppose to\par - reports nerve pain on her R thigh each morning.....the area was a previous location of a shingle rash \par - still working full time\par - life stressors have improved significantly, but not completely resolved    \par \par \par 1) SEropositive RA:  high titer RF/ CCP\par 2) REactive depression/ anxiety, r/t caregiver stress\par __________________________________________________________________________________\par \par This is a 65 y/o woman with hx of HTN, HLD, RA diagnosed many years ago.  \par \par She initially saw Dr. Nino, who told her it could be RA or could be lupus, based on some serologies.  He stopped taking her insurance, so she followed with another Rheumatologist, Dr. Perri Lara.  Last visit was several years ago.  She stopped taking the RA meds because she felt it wasn't helping.\par \par She was previously on \par Prednisone\par Humira\par MTX\par Orencia.\par \par No adverse reactions that she recalls.  \par \par Currently she has pain in hands/wrists/knees/ankles/feet.\par She has AM stiffness of about 2 hours.  \par

## 2023-07-03 NOTE — REVIEW OF SYSTEMS
[Feeling Tired] : feeling tired [Arthralgias] : arthralgias [Joint Pain] : joint pain [Anxiety] : anxiety [Depression] : depression [Negative] : Heme/Lymph [Fever] : no fever [Chills] : no chills [Eyesight Problems] : no eyesight problems [Dry Eyes] : no dryness of the eyes [Chest Pain] : no chest pain [Palpitations] : no palpitations [Leg Claudication] : no intermittent leg claudication [Lower Ext Edema] : no lower extremity edema [Shortness Of Breath] : no shortness of breath [Wheezing] : no wheezing [Abdominal Pain] : no abdominal pain [Vomiting] : no vomiting [Joint Swelling] : no joint swelling [Joint Stiffness] : no joint stiffness [Difficulty Walking] : no difficulty walking [FreeTextEntry3] : R eye lazy but no visual disturbance and early cataract L, nothing else [FreeTextEntry2] : worse w/ stress- chronic - improved  [FreeTextEntry4] : dentures upper  [FreeTextEntry5] : denies hx DVT, PE, MI/ CVA . SVT on propranolol..  [FreeTextEntry8] : hematuria resolved- bladder cancer in remission [FreeTextEntry9] : achy joints intermittently but no overt synovits [de-identified] : always fatigued, but can't sit still.. anxiety and depression moderate-severe improved today

## 2023-09-21 RX ORDER — VALACYCLOVIR 1 G/1
1 TABLET, FILM COATED ORAL
Qty: 21 | Refills: 0 | Status: ACTIVE | COMMUNITY
Start: 2022-10-18 | End: 1900-01-01

## 2023-11-08 ENCOUNTER — APPOINTMENT (OUTPATIENT)
Dept: RHEUMATOLOGY | Facility: CLINIC | Age: 68
End: 2023-11-08
Payer: COMMERCIAL

## 2023-11-08 ENCOUNTER — LABORATORY RESULT (OUTPATIENT)
Age: 68
End: 2023-11-08

## 2023-11-08 VITALS
DIASTOLIC BLOOD PRESSURE: 90 MMHG | HEIGHT: 63 IN | HEART RATE: 78 BPM | OXYGEN SATURATION: 98 % | TEMPERATURE: 97.1 F | BODY MASS INDEX: 27.64 KG/M2 | SYSTOLIC BLOOD PRESSURE: 150 MMHG | WEIGHT: 156 LBS

## 2023-11-08 DIAGNOSIS — B02.9 ZOSTER W/OUT COMPLICATIONS: ICD-10-CM

## 2023-11-08 PROCEDURE — 99213 OFFICE O/P EST LOW 20 MIN: CPT

## 2023-11-20 LAB
ALBUMIN SERPL ELPH-MCNC: 4.2 G/DL
ALP BLD-CCNC: 68 U/L
ALT SERPL-CCNC: 16 U/L
ANION GAP SERPL CALC-SCNC: 9 MMOL/L
APPEARANCE: CLEAR
AST SERPL-CCNC: 19 U/L
BILIRUB SERPL-MCNC: 0.3 MG/DL
BILIRUBIN URINE: NEGATIVE
BLOOD URINE: NEGATIVE
BUN SERPL-MCNC: 10 MG/DL
CALCIUM SERPL-MCNC: 9.4 MG/DL
CHLORIDE SERPL-SCNC: 104 MMOL/L
CHOLEST SERPL-MCNC: 173 MG/DL
CO2 SERPL-SCNC: 27 MMOL/L
COLOR: YELLOW
CREAT SERPL-MCNC: 0.53 MG/DL
CRP SERPL-MCNC: <3 MG/L
EGFR: 101 ML/MIN/1.73M2
ERYTHROCYTE [SEDIMENTATION RATE] IN BLOOD BY WESTERGREN METHOD: 22 MM/HR
ESTIMATED AVERAGE GLUCOSE: 105 MG/DL
GLUCOSE QUALITATIVE U: NEGATIVE MG/DL
GLUCOSE SERPL-MCNC: 104 MG/DL
HBA1C MFR BLD HPLC: 5.3 %
HDLC SERPL-MCNC: 79 MG/DL
IGG SER QL IEP: 1297 MG/DL
IGG1 SER-MCNC: 686 MG/DL
IGG2 SER-MCNC: 369 MG/DL
IGG3 SER-MCNC: 41.8 MG/DL
IGG4 SER-MCNC: 141.6 MG/DL
KETONES URINE: NEGATIVE MG/DL
LDLC SERPL CALC-MCNC: 83 MG/DL
LEUKOCYTE ESTERASE URINE: NEGATIVE
NITRITE URINE: NEGATIVE
NONHDLC SERPL-MCNC: 94 MG/DL
PH URINE: 7
POTASSIUM SERPL-SCNC: 4.2 MMOL/L
PROT SERPL-MCNC: 7.2 G/DL
PROTEIN URINE: NEGATIVE MG/DL
SODIUM SERPL-SCNC: 140 MMOL/L
SPECIFIC GRAVITY URINE: 1.01
TRIGL SERPL-MCNC: 57 MG/DL
UROBILINOGEN URINE: 0.2 MG/DL

## 2023-11-20 RX ADMIN — METHYLPREDNISOLONE SODIUM SUCCINATE 1 MG: 40 INJECTION, POWDER, FOR SOLUTION INTRAMUSCULAR; INTRAVENOUS at 00:00

## 2023-11-21 RX ORDER — METHYLPREDNISOLONE 40 MG/ML
40 INJECTION, POWDER, LYOPHILIZED, FOR SOLUTION INTRAMUSCULAR; INTRAVENOUS
Qty: 1 | Refills: 0 | Status: COMPLETED | OUTPATIENT
Start: 2023-11-20

## 2023-12-13 ENCOUNTER — RX RENEWAL (OUTPATIENT)
Age: 68
End: 2023-12-13

## 2024-01-02 ENCOUNTER — RX RENEWAL (OUTPATIENT)
Age: 69
End: 2024-01-02

## 2024-02-12 RX ORDER — METHYLPREDNISOLONE 125 MG/2ML
125 INJECTION, POWDER, LYOPHILIZED, FOR SOLUTION INTRAMUSCULAR; INTRAVENOUS
Refills: 0 | Status: ACTIVE | OUTPATIENT
Start: 2024-02-12

## 2024-02-16 RX ORDER — METHYLPREDNISOLONE 40 MG/ML
40 INJECTION, POWDER, LYOPHILIZED, FOR SOLUTION INTRAMUSCULAR; INTRAVENOUS
Qty: 1 | Refills: 0 | Status: COMPLETED | OUTPATIENT
Start: 2024-02-14

## 2024-02-16 NOTE — HISTORY OF PRESENT ILLNESS
[Denies] : Denies [No] : No [N/A] : N/A [Declined] : Declined [TB] : Tuberculosis screening [Hep acute panel] : Hepatitis acute panel [Right upper extremity] : Right upper extremity [22g] : 22g [Medication Name: ___] : Medication Name: [unfilled] [Start Time: ___] : Medication Start Time: [unfilled] [Patient  instructed to seek medical attention with signs and symptoms of adverse effects] : Patient  instructed to seek medical attention with signs and symptoms of adverse effects [Patient left unit in no acute distress] : Patient left unit in no acute distress [Medications administered as ordered and tolerated well.] : Medications administered as ordered and tolerated well. [End Time: ___] : Medication End Time: [unfilled] [IV discontinued. Intact. No signs or symptoms of IV complications noted. Time: ___] : IV discontinued. Intact. No signs or symptoms of IV complications noted. Time: [unfilled] [de-identified] : This is patients first infusion.  [de-identified] : 09:35 am [de-identified] : Patient kept for observation for 30 mins post infusion. No adverse effects noted.

## 2024-02-20 ENCOUNTER — APPOINTMENT (OUTPATIENT)
Dept: RHEUMATOLOGY | Facility: CLINIC | Age: 69
End: 2024-02-20

## 2024-02-27 ENCOUNTER — RX RENEWAL (OUTPATIENT)
Age: 69
End: 2024-02-27

## 2024-04-10 ENCOUNTER — RX RENEWAL (OUTPATIENT)
Age: 69
End: 2024-04-10

## 2024-05-08 ENCOUNTER — APPOINTMENT (OUTPATIENT)
Dept: RHEUMATOLOGY | Facility: CLINIC | Age: 69
End: 2024-05-08
Payer: COMMERCIAL

## 2024-05-08 VITALS
DIASTOLIC BLOOD PRESSURE: 72 MMHG | WEIGHT: 150 LBS | OXYGEN SATURATION: 97 % | HEART RATE: 77 BPM | TEMPERATURE: 97.6 F | BODY MASS INDEX: 26.58 KG/M2 | HEIGHT: 63 IN | SYSTOLIC BLOOD PRESSURE: 120 MMHG

## 2024-05-08 DIAGNOSIS — Z79.899 OTHER LONG TERM (CURRENT) DRUG THERAPY: ICD-10-CM

## 2024-05-08 DIAGNOSIS — M05.9 RHEUMATOID ARTHRITIS WITH RHEUMATOID FACTOR, UNSPECIFIED: ICD-10-CM

## 2024-05-08 DIAGNOSIS — G89.4 CHRONIC PAIN SYNDROME: ICD-10-CM

## 2024-05-08 PROCEDURE — 99214 OFFICE O/P EST MOD 30 MIN: CPT

## 2024-05-14 LAB
25(OH)D3 SERPL-MCNC: 18.5 NG/ML
ALBUMIN SERPL ELPH-MCNC: 4 G/DL
ALP BLD-CCNC: 49 U/L
ALT SERPL-CCNC: 17 U/L
ANION GAP SERPL CALC-SCNC: 10 MMOL/L
APPEARANCE: CLEAR
AST SERPL-CCNC: 20 U/L
BILIRUB SERPL-MCNC: 0.4 MG/DL
BILIRUBIN URINE: NEGATIVE
BLOOD URINE: NEGATIVE
BUN SERPL-MCNC: 16 MG/DL
CALCIUM SERPL-MCNC: 9 MG/DL
CALCIUM SERPL-MCNC: 9.1 MG/DL
CD16+CD56+ CELLS # BLD: 135 CELLS/UL
CD16+CD56+ CELLS NFR BLD: 11 %
CD19 CELLS NFR BLD: 53 CELLS/UL
CD3 CELLS # BLD: 945 CELLS/UL
CD3 CELLS NFR BLD: 80 %
CD3+CD4+ CELLS # BLD: 724 CELLS/UL
CD3+CD4+ CELLS NFR BLD: 62 %
CD3+CD4+ CELLS/CD3+CD8+ CLL SPEC: 3.33 RATIO
CD3+CD8+ CELLS # SPEC: 218 CELLS/UL
CD3+CD8+ CELLS NFR BLD: 19 %
CELLS.CD3-CD19+/CELLS IN BLOOD: 4 %
CHLORIDE SERPL-SCNC: 104 MMOL/L
CO2 SERPL-SCNC: 26 MMOL/L
COLOR: YELLOW
CREAT SERPL-MCNC: 0.52 MG/DL
CRP SERPL-MCNC: <3 MG/L
EGFR: 102 ML/MIN/1.73M2
ERYTHROCYTE [SEDIMENTATION RATE] IN BLOOD BY WESTERGREN METHOD: 17 MM/HR
GLUCOSE QUALITATIVE U: NEGATIVE MG/DL
GLUCOSE SERPL-MCNC: 108 MG/DL
HAV IGM SER QL: NONREACTIVE
HBV CORE IGG+IGM SER QL: NONREACTIVE
HBV CORE IGM SER QL: NONREACTIVE
HBV SURFACE AG SER QL: NONREACTIVE
HCT VFR BLD CALC: 39.6 %
HCV AB SER QL: NONREACTIVE
HCV S/CO RATIO: 0.07 S/CO
HGB BLD-MCNC: 12.8 G/DL
IGG SER QL IEP: 1042 MG/DL
IGG1 SER-MCNC: 578 MG/DL
IGG2 SER-MCNC: 300 MG/DL
IGG3 SER-MCNC: 31.7 MG/DL
IGG4 SER-MCNC: 93.9 MG/DL
KETONES URINE: NEGATIVE MG/DL
LEUKOCYTE ESTERASE URINE: NEGATIVE
M TB IFN-G BLD-IMP: NEGATIVE
MCHC RBC-ENTMCNC: 28.7 PG
MCHC RBC-ENTMCNC: 32.3 GM/DL
MCV RBC AUTO: 88.8 FL
NITRITE URINE: NEGATIVE
PARATHYROID HORMONE INTACT: 64 PG/ML
PH URINE: 6.5
PLATELET # BLD AUTO: 160 K/UL
POTASSIUM SERPL-SCNC: 3.9 MMOL/L
PROT SERPL-MCNC: 6.5 G/DL
PROTEIN URINE: NEGATIVE MG/DL
QUANTIFERON TB PLUS MITOGEN MINUS NIL: 6.2 IU/ML
QUANTIFERON TB PLUS NIL: 0.03 IU/ML
QUANTIFERON TB PLUS TB1 MINUS NIL: 0 IU/ML
QUANTIFERON TB PLUS TB2 MINUS NIL: 0 IU/ML
RBC # BLD: 4.46 M/UL
RBC # FLD: 13.5 %
SODIUM SERPL-SCNC: 140 MMOL/L
SPECIFIC GRAVITY URINE: 1.02
UROBILINOGEN URINE: 0.2 MG/DL
WBC # FLD AUTO: 4.4 K/UL

## 2024-05-14 RX ORDER — CHOLECALCIFEROL (VITAMIN D3) 1250 MCG
1.25 MG TABLET ORAL
Qty: 12 | Refills: 3 | Status: ACTIVE | COMMUNITY
Start: 2021-05-20 | End: 1900-01-01

## 2024-05-14 NOTE — REVIEW OF SYSTEMS
[Feeling Tired] : feeling tired [Arthralgias] : arthralgias [Joint Pain] : joint pain [Anxiety] : anxiety [Depression] : depression [Negative] : Heme/Lymph [As Noted in HPI] : as noted in HPI [Fever] : no fever [Chills] : no chills [Eyesight Problems] : no eyesight problems [Dry Eyes] : no dryness of the eyes [Chest Pain] : no chest pain [Palpitations] : no palpitations [Leg Claudication] : no intermittent leg claudication [Lower Ext Edema] : no lower extremity edema [Shortness Of Breath] : no shortness of breath [Wheezing] : no wheezing [Abdominal Pain] : no abdominal pain [Vomiting] : no vomiting [Joint Swelling] : no joint swelling [Joint Stiffness] : no joint stiffness [Difficulty Walking] : no difficulty walking [FreeTextEntry2] : worse w/ stress- chronic - RESOLVED and doing well  [FreeTextEntry3] : R eye lazy but no visual disturbance and early cataract L, nothing else [FreeTextEntry4] : dentures upper  [FreeTextEntry5] : denies hx DVT, PE, MI/ CVA . SVT on propranolol..  [FreeTextEntry9] : ..morning stiffness manageable...only c/o ankle pain at night and radiating buttock pain down the back of her leg [FreeTextEntry8] : hematuria resolved- bladder cancer in remission [de-identified] : intermittent pins and needles in calves [de-identified] : always fatigued, but can't sit still.. anxiety and depression moderate-severe- doing much better today

## 2024-05-14 NOTE — PHYSICAL EXAM
[Sclera] : the sclera and conjunctiva were normal [Extraocular Movements] : extraocular movements were intact [Outer Ear] : the ears and nose were normal in appearance [Oropharynx] : the oropharynx was normal [Neck Appearance] : the appearance of the neck was normal [Respiration, Rhythm And Depth] : normal respiratory rhythm and effort [Auscultation Breath Sounds / Voice Sounds] : lungs were clear to auscultation bilaterally [Heart Rate And Rhythm] : heart rate was normal and rhythm regular [Heart Sounds] : normal S1 and S2 [Heart Sounds Gallop] : no gallops [Murmurs] : no murmurs [Heart Sounds Pericardial Friction Rub] : no pericardial rub [No CVA Tenderness] : no ~M costovertebral angle tenderness [No Spinal Tenderness] : no spinal tenderness [Skin Color & Pigmentation] : normal skin color and pigmentation [Skin Turgor] : normal skin turgor [] : no rash [Motor Exam] : the motor exam was normal [No Focal Deficits] : no focal deficits [Oriented To Time, Place, And Person] : oriented to person, place, and time [FreeTextEntry1] : No acute synovitis- Bl shoulders lack 20-30 degrees full abduction -> now lacking 10-20 degrees- no ankle pain/ or swelling  ; all other pain resolved w/no active synovitis

## 2024-05-14 NOTE — ASSESSMENT
[FreeTextEntry1] : 69 y/o woman with hx of HTN, HLD, NERISSA/ MDD w/ chronic Rx pain medications, presents for management of her hx of seropositive RA. Bladder Cancer 2022- remission requiring BCG tx  1) RA: seropositive , CCP > 250 w/ ESR 47 upon initial eval.. Switched to RTX 5/23 full dose given and stopped LEF as well, has been taking 5 mg pred 3 x/ wk ever since but feeling well.. restarted LEF at 20 mg and able to taper off steroids... No active synovitis on exam today. Updated labs with CD19 at 4% and IGG 1042..  Given evidence of remission with CDAI essentially 0 no need to  resume Rituxan at this time and follow up in three months sooner if joint inflammation returns Tolerated RTX well with no complications/ infections or other issues.  - Life finally better but continues to work FT and still some stress.. son's legal issues.  doing very well s/p amputation.. Still need updated xrays and Dexa done... baseline studies to. r/o erosive arthropathy (knees and hands) needed NOTE: Xeljanz concern for hypercoag state r/t bladder cancer. (though no previous issues).. was switched. Had been fairly good control, though not as good as RTX. Ex-smoker: stopped 2 ys ago denies any hx of DVt/PE/MI/CVA or family hx of Lengthy discussion today regarding bb warning on Xeljanz. Little to suggest CV risk to include completely nl lipid levels without statins, no smoking hx and no personal of FH of clotting disorders. Reviewed s/s of MI/ CVA/ TIA/ DVT and advised to report immediately if presents. Humira x 1 y then loss efficacy MTX ?? not effective or tolerated? Orencia x 1 yr then loss efficacy Was considering RTX due to difficulty following medical regimen and excellent response to RTX with sister (not necessary now) Xeljanz very effective but concerned about malignancy newly dx bladder cancer not responding to standard tx  2) Mood disorder/ Chronic pain: ys of NERISSA/ MDD intermittent tx in past. Tolerated lexapro but didn't think it did anything and again stopped. Trial of wellbutrin  now at 150 mg no need for additional does overall doing very well., though still requiring valium at 5 mg QD-BID, and suboxone 8-2 once dailly (need to clarify), gabapentin stable dose 300-600 mg at HS.. Sleep ok: though NRS.. exhausted and falls right to sleep, sleeps. Should consider Sleep study but too stressed to consider- better overall lately  NOTE : sertraline not effective Trial buspar/ hydrozyine for anxiety NOT tolerated (too sedating) Lexapro previously before and citalopram (too sedating) not effective Buspar too sedating citalopram 10 mg not effective, increased to 20 mg w/ minimal benefit and too much fatigue.  3) Chronic pain syndrome: likely 2/2 fair/ poorly controlled RA, little on exam to suggest advanced DJD.. Longstanding use Suboxone working w/ Dr. Jara.. (see above)  4) Overweight: BMI 24-> 27 -> 28 now.25 , improved but due to stress  5.) Cervicalgia - limited ROM and neck pain throughout the day intermittently, not active issue now. Brief course of Tizanidine w/ + response. Not necessary now and still occcas valium. Stable  6) Immunosuppression: long standing use of steroids and other DMARds.. need to check IGG levels (10/42 IGG  level 5/24- was 1079 before RTX)..    Plan: - complete labs today   - stable dose of gabapentin to 600 mg   - needs refill of leflunomide, prednisone, gabapentin, and tizanidine  - get COVID booster and shingles vaccine together  - WAS THIS DONE she is concerned about catching COVID since people at her job has gotten it recently  needs to get these before she gets her next rituxan dose   - knows to call the office if she gets any infection especially COVID  - continue leflunomide at 20 mg  - complete mammography and repeat DEXA -   - continue valacyclovir 1000mg as needed  - rto 3m  -Is aware to call if there is any change in her underlying symptoms.

## 2024-05-14 NOTE — HISTORY OF PRESENT ILLNESS
[FreeTextEntry1] : 5/8/2024  DOES SHE STILL HAVE PERIPHERAL EDEMA - LOOK AT VASCULAR SECTION OF PHYSICAL EXAM HOW DO YOU WANT TO DESCRIBE HER ANKLE IS THE SKIN SECTION STILL ACCURATE  - doing well....only c/o ankle pain at night w/ intermittent pin and needles in her calves....describes the ankle pain as a shooting throbbing pain - reports she intermittently gets RLS....on gabapentin 300 mg  - on leflunomide 20 mg and doing well....last infusion was a year ago and her RA is relatively quiet (reports she is still paying for her last dose and can't afford to get another dose of rituxan at the moment) - takes 5 mg of prednisone 2-3 times a week...finds she needs more recently...reports morning stiffness is manageable  - suspects she has + sciatica....c/o buttocks pain that radiates down the back of her leg   11/8/2023  - her RA is relatively good....only c/o R thigh pain - 10-15 mins or morning stiffness - reports overall symptoms are better on Rituxan compared to the Xeljanz   - still on 20 mg of leflunomide  - tizanidine nightly  - on gabapentin 300 mg 1-4 tabs PRN  - does not recall when or if she every started Wellbutrin  - she received HD flu shot for work....reports illness afterwards (fevers and chills) - has yet to get her shingles vaccine, because she was told to wait 3 months after her last episode of shingles  - not planning on getting new COVID vaccine....she has 4 vaccines and had covid twice   - has yet to get DEXA....reports its because she is busy w/ her frequent checks ups for her bladder - previously had bladder cancer w/ no return as of yet - has another check up soon    6/27/2023  - no longer on xeljanz and has completed two doses of rituxan....most recent dose being may 2023  - reports feeling much better w/ some joint pain intermittently mainly in her hands but not associated w/ stiffness or swelling, overall marked improvement but  - still takes 5 mg of prednisone three times a week as needed - has not been taking her leflunomide because she was unsure if she was suppose to - reports nerve pain on her R thigh each morning.....the area was a previous location of a shingle rash  - still working full time - life stressors have improved significantly, but not completely resolved       1) SEropositive RA:  high titer RF/ CCP 2) REactive depression/ anxiety, r/t caregiver stress __________________________________________________________________________________  This is a 65 y/o woman with hx of HTN, HLD, RA diagnosed many years ago.    She initially saw Dr. Nino, who told her it could be RA or could be lupus, based on some serologies.  He stopped taking her insurance, so she followed with another Rheumatologist, Dr. Perri Lara.  Last visit was several years ago.  She stopped taking the RA meds because she felt it wasn't helping.  She was previously on  Prednisone Humira MTX Orencia.  No adverse reactions that she recalls.    Currently she has pain in hands/wrists/knees/ankles/feet. She has AM stiffness of about 2 hours.

## 2024-05-27 LAB
AA PROT SER-MCNC: 1 UG/ML
BIOMARKER COMMENT: NORMAL
CRP SERPL-MCNC: 0.25 MG/L
EGF SERPL-MCNC: 120 PG/ML
FOOTNOTE: NORMAL
IL6 SERPL-MCNC: 20 PG/ML
LEPTIN SERPL-MCNC: 18 NG/ML
Lab: NORMAL
Lab: NORMAL
MMP-1 SERPL-MCNC: 1.7 NG/ML
MMP-3 SERPL-MCNC: 60 NG/ML
RA DAS LEVEL QL VECTRADA: NORMAL
RESISTIN SERPL-MCNC: 3.8 NG/ML
RISK OF RADIOGRAPHIC PROGRESS: 3 %
TNFRSF1A SERPL-MCNC: 0.82 NG/ML
VAP-1 SERPL-MCNC: 0.68 UG/ML
VECTRA SCORE: 30
VEGF-A SERPL-MCNC: 270 PG/ML
YKL-40 RESULT: 57 NG/ML

## 2024-07-01 ENCOUNTER — NON-APPOINTMENT (OUTPATIENT)
Age: 69
End: 2024-07-01

## 2024-07-02 ENCOUNTER — APPOINTMENT (OUTPATIENT)
Dept: RHEUMATOLOGY | Facility: CLINIC | Age: 69
End: 2024-07-02
Payer: COMMERCIAL

## 2024-07-02 VITALS
SYSTOLIC BLOOD PRESSURE: 190 MMHG | HEIGHT: 63 IN | BODY MASS INDEX: 26.58 KG/M2 | OXYGEN SATURATION: 96 % | WEIGHT: 150 LBS | HEART RATE: 96 BPM | TEMPERATURE: 97.5 F | DIASTOLIC BLOOD PRESSURE: 88 MMHG

## 2024-07-02 PROCEDURE — 99213 OFFICE O/P EST LOW 20 MIN: CPT

## 2024-07-02 PROCEDURE — G2211 COMPLEX E/M VISIT ADD ON: CPT | Mod: NC

## 2024-07-02 RX ORDER — ALPRAZOLAM 0.25 MG/1
0.25 TABLET ORAL
Qty: 5 | Refills: 0 | Status: ACTIVE | COMMUNITY
Start: 2024-07-02 | End: 1900-01-01

## 2024-07-06 RX ORDER — CHOLECALCIFEROL (VITAMIN D3) 1250 MCG
1.25 MG CAPSULE ORAL
Qty: 12 | Refills: 0 | Status: ACTIVE | COMMUNITY
Start: 2024-05-14

## 2024-07-06 RX ORDER — ATORVASTATIN CALCIUM 40 MG/1
40 TABLET, FILM COATED ORAL
Qty: 90 | Refills: 0 | Status: ACTIVE | COMMUNITY
Start: 2024-05-24

## 2024-07-06 RX ORDER — PREDNISONE 5 MG/1
5 TABLET ORAL
Qty: 90 | Refills: 0 | Status: ACTIVE | COMMUNITY
Start: 2024-06-21

## 2024-09-04 ENCOUNTER — APPOINTMENT (OUTPATIENT)
Dept: RHEUMATOLOGY | Facility: CLINIC | Age: 69
End: 2024-09-04

## 2025-02-26 RX ORDER — PREDNISONE 5 MG/1
5 TABLET ORAL
Qty: 90 | Refills: 1 | Status: ACTIVE | COMMUNITY
Start: 2025-02-26 | End: 1900-01-01

## 2025-05-21 ENCOUNTER — APPOINTMENT (OUTPATIENT)
Dept: RHEUMATOLOGY | Facility: CLINIC | Age: 70
End: 2025-05-21
Payer: COMMERCIAL

## 2025-05-21 VITALS
OXYGEN SATURATION: 98 % | SYSTOLIC BLOOD PRESSURE: 198 MMHG | DIASTOLIC BLOOD PRESSURE: 98 MMHG | WEIGHT: 150 LBS | HEART RATE: 103 BPM | HEIGHT: 63 IN | BODY MASS INDEX: 26.58 KG/M2 | TEMPERATURE: 97.5 F

## 2025-05-21 DIAGNOSIS — G89.4 CHRONIC PAIN SYNDROME: ICD-10-CM

## 2025-05-21 DIAGNOSIS — F32.A ANXIETY DISORDER, UNSPECIFIED: ICD-10-CM

## 2025-05-21 DIAGNOSIS — F43.89 OTHER REACTIONS TO SEVERE STRESS: ICD-10-CM

## 2025-05-21 DIAGNOSIS — F41.9 ANXIETY DISORDER, UNSPECIFIED: ICD-10-CM

## 2025-05-21 DIAGNOSIS — Z79.891 LONG TERM (CURRENT) USE OF OPIATE ANALGESIC: ICD-10-CM

## 2025-05-21 DIAGNOSIS — M05.9 RHEUMATOID ARTHRITIS WITH RHEUMATOID FACTOR, UNSPECIFIED: ICD-10-CM

## 2025-05-21 DIAGNOSIS — G47.00 INSOMNIA, UNSPECIFIED: ICD-10-CM

## 2025-05-21 PROCEDURE — G2211 COMPLEX E/M VISIT ADD ON: CPT | Mod: NC

## 2025-05-21 PROCEDURE — 99213 OFFICE O/P EST LOW 20 MIN: CPT

## 2025-05-21 RX ORDER — DICLOFENAC SODIUM 10 MG/G
1 GEL TOPICAL
Qty: 3 | Refills: 3 | Status: ACTIVE | COMMUNITY
Start: 2025-05-21 | End: 1900-01-01

## 2025-05-21 RX ORDER — SERTRALINE HYDROCHLORIDE 50 MG/1
50 TABLET, FILM COATED ORAL
Qty: 60 | Refills: 2 | Status: ACTIVE | COMMUNITY
Start: 2025-05-21 | End: 1900-01-01

## 2025-05-21 RX ORDER — HYDROCHLOROTHIAZIDE 12.5 MG/1
12.5 CAPSULE ORAL
Refills: 0 | Status: ACTIVE | COMMUNITY

## 2025-05-22 VITALS — DIASTOLIC BLOOD PRESSURE: 92 MMHG | SYSTOLIC BLOOD PRESSURE: 180 MMHG

## 2025-05-22 PROBLEM — F43.89 CAREGIVER STRESS SYNDROME: Status: ACTIVE | Noted: 2021-07-28

## 2025-05-28 RX ORDER — ZOSTER VACCINE RECOMBINANT, ADJUVANTED 50 MCG/0.5
50 KIT INTRAMUSCULAR
Qty: 1 | Refills: 1 | Status: ACTIVE | COMMUNITY
Start: 2025-05-21

## 2025-07-14 ENCOUNTER — APPOINTMENT (OUTPATIENT)
Dept: RHEUMATOLOGY | Facility: CLINIC | Age: 70
End: 2025-07-14
Payer: COMMERCIAL

## 2025-07-14 PROCEDURE — 99213 OFFICE O/P EST LOW 20 MIN: CPT | Mod: 93

## 2025-07-14 RX ORDER — BUSPIRONE HYDROCHLORIDE 5 MG/1
5 TABLET ORAL
Qty: 60 | Refills: 11 | Status: ACTIVE | COMMUNITY
Start: 2025-07-14 | End: 1900-01-01

## 2025-08-27 ENCOUNTER — APPOINTMENT (OUTPATIENT)
Dept: RHEUMATOLOGY | Facility: CLINIC | Age: 70
End: 2025-08-27
Payer: COMMERCIAL

## 2025-08-27 ENCOUNTER — NON-APPOINTMENT (OUTPATIENT)
Age: 70
End: 2025-08-27

## 2025-08-27 VITALS
SYSTOLIC BLOOD PRESSURE: 128 MMHG | BODY MASS INDEX: 25.16 KG/M2 | TEMPERATURE: 97.9 F | OXYGEN SATURATION: 96 % | HEART RATE: 106 BPM | WEIGHT: 142 LBS | DIASTOLIC BLOOD PRESSURE: 80 MMHG | HEIGHT: 63 IN

## 2025-08-27 DIAGNOSIS — M05.9 RHEUMATOID ARTHRITIS WITH RHEUMATOID FACTOR, UNSPECIFIED: ICD-10-CM

## 2025-08-27 PROCEDURE — 99214 OFFICE O/P EST MOD 30 MIN: CPT

## 2025-08-27 PROCEDURE — G2211 COMPLEX E/M VISIT ADD ON: CPT | Mod: NC

## 2025-08-27 RX ADMIN — RITUXIMAB-PVVR 0 MG/50ML: 500 INJECTION, SOLUTION INTRAVENOUS at 00:00

## 2025-08-28 RX ORDER — ACETAMINOPHEN 325 MG/1
325 TABLET ORAL
Refills: 0 | Status: ACTIVE | OUTPATIENT
Start: 2025-08-28

## 2025-08-28 RX ORDER — CETIRIZINE HYDROCHLORIDE 10 MG/1
10 TABLET, COATED ORAL
Refills: 0 | Status: ACTIVE | OUTPATIENT
Start: 2025-08-28

## 2025-08-28 RX ORDER — METHYLPREDNISOLONE SODIUM SUCCINATE 125 MG/2ML
125 INJECTION, POWDER, FOR SOLUTION INTRAMUSCULAR; INTRAVENOUS
Refills: 0 | Status: ACTIVE | OUTPATIENT
Start: 2025-08-28